# Patient Record
Sex: MALE | Race: WHITE | Employment: UNEMPLOYED | URBAN - METROPOLITAN AREA
[De-identification: names, ages, dates, MRNs, and addresses within clinical notes are randomized per-mention and may not be internally consistent; named-entity substitution may affect disease eponyms.]

---

## 2017-03-03 ENCOUNTER — HOSPITAL ENCOUNTER (EMERGENCY)
Age: 42
Discharge: HOME | End: 2017-03-04
Attending: EMERGENCY MEDICINE | Admitting: EMERGENCY MEDICINE
Payer: SELF-PAY

## 2017-03-03 VITALS
WEIGHT: 250 LBS | SYSTOLIC BLOOD PRESSURE: 126 MMHG | OXYGEN SATURATION: 93 % | BODY MASS INDEX: 31.08 KG/M2 | HEIGHT: 75 IN | TEMPERATURE: 97.8 F | RESPIRATION RATE: 18 BRPM | HEART RATE: 95 BPM | DIASTOLIC BLOOD PRESSURE: 89 MMHG

## 2017-03-03 DIAGNOSIS — F41.8 ANXIETY ASSOCIATED WITH DEPRESSION: Primary | ICD-10-CM

## 2017-03-03 PROCEDURE — 99283 EMERGENCY DEPT VISIT LOW MDM: CPT

## 2017-03-03 RX ORDER — LORAZEPAM 1 MG/1
1 TABLET ORAL
Status: COMPLETED | OUTPATIENT
Start: 2017-03-03 | End: 2017-03-04

## 2017-03-04 PROCEDURE — 74011250637 HC RX REV CODE- 250/637: Performed by: EMERGENCY MEDICINE

## 2017-03-04 RX ORDER — LORAZEPAM 1 MG/1
1 TABLET ORAL
Qty: 5 TAB | Refills: 0 | Status: SHIPPED | OUTPATIENT
Start: 2017-03-04 | End: 2017-07-01

## 2017-03-04 RX ADMIN — LORAZEPAM 1 MG: 1 TABLET ORAL at 00:01

## 2017-03-04 NOTE — ED NOTES
Patient arrived through triage c/o \"feeling out of it\" due to not sleeping x 3 days. Patient states that he normally takes ativan to sleep, and other sleep medications do not work. Patient is resting in bed, bed in low position, call bell in reach.

## 2017-03-04 NOTE — ED NOTES
Dr. Gabbie Arellano reviewed discharge instructions with the patient. The patient verbalized understanding. All questions and concerns were addressed. The patient declined a wheelchair and is discharged ambulatory in the care of family members with instructions and prescriptions in hand. Pt is alert and oriented x 4. Respirations are clear and unlabored.

## 2017-03-04 NOTE — ED TRIAGE NOTES
Pt arrives with c/o of insomnia from the past 3 days. Pt reports he is out of Ativan. Pt states this happens every 3-4 months. Increased anxiety, \" It feels like I am burning with anxiety and I cannot relax\".

## 2017-03-04 NOTE — ED PROVIDER NOTES
HPI Comments: 42 y.o. male with past medical history significant for HTN, PE, Anxiety disorder, Depression, Alcohol abuse, SI who presents from home via taxi with chief complaint of insomnia. Pt reports he has been without sleep x 3 days. Pt states he has had multiple stressors in his life recently related to being required to move. He also c/o anxiety. He notes hx of similar symptoms every \"3-4 months\" at which time Ativan controls symptoms. Pt is followed at the [facility] for primary care and psych. Pt denies SI, HI, hallucinations, fever, chills, urinary symptoms, chest pain, SOB, abdominal pain, nausea, vomiting, dairrhea, constiaption, back pain or neck pain. There are no other acute medical concerns at this time. Social hx: + Occassional EtOH use (\"1x a week\"). Non-smoker. There are no other acute medical concerns at this time. PCP: Anya Guadarrama MD    Note written by Jean Webber, as dictated by Naty Negrete MD 12:09 AM        The history is provided by the patient. No  was used. 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 Family History:   Problem Relation Age of Onset   [facility] Cancer Mother      carcinoma that was metastaic to liver and brain    Heart Disease Father     Depression Father     Substance Abuse Maternal Grandfather     Depression Sister        Social History     Social History    Marital status:      Spouse name: N/A    Number of children: N/A    Years of education: N/A     Occupational History    Not on file.      Social History Main Topics    Smoking status: Former Smoker  Smokeless tobacco: Never Used    Alcohol use 8.0 oz/week     16 Shots of liquor per week      Comment: per pt\"2 quarts of liquor daily\"    Drug use: Yes     Special: Marijuana      Comment: last use 7/25/15    Sexual activity: No     Other Topics Concern    Not on file     Social History Narrative    Pt reports he is , unemployed (on disability.) Continues to have legal problems related to harassment of various family members. Numerous legal problems related to alcohol use. ALLERGIES: Risperidone and Cyclobenzaprine    Review of Systems   Constitutional: Negative for chills and fever. HENT: Negative for congestion. Respiratory: Negative for cough and shortness of breath. Cardiovascular: Negative for chest pain. Gastrointestinal: Negative for abdominal pain, constipation, diarrhea, nausea and vomiting. Genitourinary: Negative for difficulty urinating and dysuria. Musculoskeletal: Negative for back pain and neck pain. Neurological: Negative for headaches. Psychiatric/Behavioral: Positive for sleep disturbance. Negative for hallucinations and suicidal ideas. The patient is nervous/anxious.         - HI   All other systems reviewed and are negative. Vitals:    03/03/17 2346   BP: 126/89   Pulse: 95   Resp: 18   Temp: 97.8 °F (36.6 °C)   SpO2: 93%   Weight: 113.4 kg (250 lb)   Height: 6' 3\" (1.905 m)            Physical Exam   Nursing note and vitals reviewed. CONSTITUTIONAL: Well-appearing; well-nourished; in no apparent distress  HEAD: Normocephalic; atraumatic  EYES: PERRL; EOM intact; conjunctiva and sclera are clear bilaterally. ENT: No rhinorrhea; normal pharynx with no tonsillar hypertrophy; mucous membranes pink/moist, no erythema, no exudate. NECK: Supple; non-tender; no cervical lymphadenopathy  CARD: Normal S1, S2; no murmurs, rubs, or gallops. Regular rate and rhythm.   RESP: Normal respiratory effort; breath sounds clear and equal bilaterally; no wheezes, rhonchi, or rales. ABD: Normal bowel sounds; non-distended; non-tender; no palpable organomegaly, no masses, no bruits. Back Exam: Normal inspection; no vertebral point tenderness, no CVA tenderness. Normal range of motion. EXT: Normal ROM in all four extremities; non-tender to palpation; no swelling or deformity; distal pulses are normal, no edema. SKIN: Warm; dry; no rash. NEURO:Alert and oriented x 3, coherent, YUMIKO-XII grossly intact, sensory and motor are non-focal.  Psych exam: Flat affect, anxious and normal mood; Pt denies SI/HI on direct questioning        MDM  Number of Diagnoses or Management Options  Diagnosis management comments: Assessment: 70-year-old male with anxiety and depression, who appears hemodynamically stable. Patient has a history of alcoholism, but exhibited no symptoms of withdrawal at this time. He appeared hemodynamically stable and coherent. He denies suicidal ideation and homicidal ideation. He doesn't appear to be a risk to himself or others. Plan: Education and reassurance/ Ativan 1 mg p.o./ symptomatic care/ discharge with outpatient [facility]        Amount and/or Complexity of Data Reviewed  Clinical lab tests: ordered and reviewed  Tests in the radiology section of CPT®: ordered and reviewed  Tests in the medicine section of CPT®: reviewed and ordered  Discussion of test results with the performing providers: yes  Decide to obtain previous medical records or to obtain history from someone other than the patient: yes  Obtain history from someone other than the patient: yes  Review and summarize past medical records: yes  Discuss the patient with other providers: yes  Independent visualization of images, tracings, or specimens: yes    Risk of Complications, Morbidity, and/or Mortality  Presenting problems: moderate  Diagnostic procedures: moderate  Management options: moderate      ED Course       Procedures     Progress Note:   Pt has been reexamined by Klever Raman MD. Pt is feeling much better. Symptoms have improved. All available results have been reviewed with pt and any available family. Pt understands sx, dx, and tx in ED. Care plan has been outlined and questions have been answered. Pt is ready to go home. Will send home on Anxiety/Depression instructions. Prescription of Ativan. Outpatient referral with PCP/ [facility] as needed.  Written by Klever Raman MD,12:02 AM

## 2017-03-04 NOTE — DISCHARGE INSTRUCTIONS
We hope that we have addressed all of your medical concerns. The examination and treatment you received in the Emergency Department were for an emergent problem and were not intended as complete care. It is important that you follow up with your healthcare provider(s) for ongoing care. If your symptoms worsen or do not improve as expected, and you are unable to reach your usual health care provider(s), you should return to the Emergency Department. Today's healthcare is undergoing tremendous change, and patient satisfaction surveys are one of the many tools to assess the quality of medical care. You may receive a survey from the [facility] regarding your experience in the Emergency Department. I hope that your experience has been completely positive, particularly the medical care that I provided. As such, please participate in the survey; anything less than excellent does not meet my expectations or intentions. [address] [place] [address] and [employer] participate in nationally recognized quality of care measures. If your blood pressure is greater than 120/80, as reported below, we urge that you seek medical care to address the potential of high blood pressure, commonly known as hypertension. Hypertension can be hereditary or can be caused by certain medical conditions, pain, stress, or \"white coat syndrome. \"       Please make an appointment with your health care provider(s) for follow up of your Emergency Department visit. VITALS:   Patient Vitals for the past 8 hrs:   Temp Pulse Resp BP SpO2   03/03/17 2346 97.8 °F (36.6 °C) 95 18 126/89 93 %          Thank you for allowing us to provide you with medical care today. We realize that you have many choices for your emergency care needs. Please choose us in the future for any continued health care needs. Mahesh Carvajal MD    [facility].   Office: [phone]            No results found for this or any previous visit (from the past 24 hour(s)). No results found. Learning About Anxiety Disorders  What are anxiety disorders? Anxiety disorders are a type of medical problem. They cause severe anxiety. When you feel anxious, you feel that something bad is about to happen. This feeling interferes with your life. These disorders include:  · Generalized anxiety disorder. You feel worried and stressed about many everyday events and activities. This goes on for several months and disrupts your life on most days. · Panic disorder. You have repeated panic attacks. A panic attack is a sudden, intense fear or anxiety. It may make you feel short of breath. Your heart may pound. · Social anxiety disorder. You feel very anxious about what you will say or do in front of people. For example, you may be scared to talk or eat in public. This problem affects your daily life. · Phobias. You are very scared of a specific object, situation, or activity. For example, you may fear spiders, high places, or small spaces. What are the symptoms? Generalized anxiety disorder  Symptoms may include:  · Feeling worried and stressed about many things almost every day. · Feeling tired or irritable. You may have a hard time concentrating. · Having headaches or muscle aches. · Having a hard time swallowing. · Feeling shaky, sweating, or having hot flashes. Panic disorder  You may have repeated panic attacks when there is no reason for feeling afraid. You may change your daily activities because you worry that you will have another attack. Symptoms may include:  · Intense fear, terror, or anxiety. · Trouble breathing or very fast breathing. · Chest pain or tightness. · A heartbeat that races or is not regular. Social anxiety disorder  Symptoms may include:  · Fear about a social situation, such as eating in front of others or speaking in public. You may worry a lot. Or you may be afraid that something bad will happen. · Anxiety that can cause you to blush, sweat, and feel shaky. · A heartbeat that is faster than normal.  · A hard time focusing. Phobias  Symptoms may include:  · More fear than most people of being around an object, being in a situation, or doing an activity. You might also be stressed about the chance of being around the thing you fear. · Worry about losing control, panicking, fainting, or having physical symptoms like a faster heartbeat when you are around the situation or object. How are these disorders treated? Anxiety disorders can be treated with medicines or counseling. A combination of both may be used. Medicines may include:  · Antidepressants. These may help your symptoms by keeping chemicals in your brain in balance. · Benzodiazepines. These may give you short-term relief of your symptoms. Some people use cognitive-behavioral therapy. A therapist helps you learn to change stressful or bad thoughts into helpful thoughts. Lead a healthy lifestyle  A healthy lifestyle may help you feel better. · Get at least 30 minutes of exercise on most days of the week. Walking is a good choice. · Eat a healthy diet. Include fruits, vegetables, lean proteins, and whole grains in your diet each day. · Try to go to bed at the same time every night. Try for 8 hours of sleep a night. · Find ways to manage stress. Try relaxation exercises. · Avoid alcohol and illegal drugs. Follow-up care is a key part of your treatment and safety. Be sure to make and go to all appointments, and call your doctor if you are having problems. It's also a good idea to know your test results and keep a list of the medicines you take. Where can you learn more? Go to http://sapna-jaron.info/. Enter K645 in the search box to learn more about \"Learning About Anxiety Disorders. \"  Current as of: July 26, 2016  Content Version: 11.1  © 3993-7150 [employer], [employer]. Care instructions adapted under license by [employer] (which disclaims liability or warranty for this information). If you have questions about a medical condition or this instruction, always ask your healthcare professional. [address] any warranty or liability for your use of this information.

## 2017-03-04 NOTE — ED NOTES
Patient attributes trouble sleeping due to anxiety, increased stress due to having to move suddenly.

## 2017-05-06 ENCOUNTER — ED HISTORICAL/CONVERTED ENCOUNTER (OUTPATIENT)
Dept: OTHER | Age: 42
End: 2017-05-06

## 2017-06-18 ENCOUNTER — APPOINTMENT (OUTPATIENT)
Dept: CT IMAGING | Age: 42
DRG: 897 | End: 2017-06-18
Attending: PHYSICIAN ASSISTANT
Payer: MEDICARE

## 2017-06-18 ENCOUNTER — HOSPITAL ENCOUNTER (INPATIENT)
Age: 42
LOS: 4 days | Discharge: HOME | DRG: 897 | End: 2017-06-22
Attending: EMERGENCY MEDICINE | Admitting: HOSPITALIST
Payer: MEDICARE

## 2017-06-18 DIAGNOSIS — S37.011A RENAL HEMATOMA, RIGHT, INITIAL ENCOUNTER: ICD-10-CM

## 2017-06-18 DIAGNOSIS — I26.99 PE (PULMONARY THROMBOEMBOLISM) (HCC): Primary | ICD-10-CM

## 2017-06-18 DIAGNOSIS — F10.10 ALCOHOL ABUSE: ICD-10-CM

## 2017-06-18 PROBLEM — E87.1 HYPONATREMIA: Status: ACTIVE | Noted: 2017-06-18

## 2017-06-18 PROBLEM — S37.019A RENAL HEMATOMA: Status: ACTIVE | Noted: 2017-06-18

## 2017-06-18 LAB
ALBUMIN SERPL BCP-MCNC: 3.4 G/DL (ref 3.5–5)
ALBUMIN/GLOB SERPL: 0.9 {RATIO} (ref 1.1–2.2)
ALP SERPL-CCNC: 135 U/L (ref 45–117)
ALT SERPL-CCNC: 31 U/L (ref 12–78)
AMPHET UR QL SCN: NEGATIVE
ANION GAP BLD CALC-SCNC: 15 MMOL/L (ref 5–15)
APAP SERPL-MCNC: <2 UG/ML (ref 10–30)
APPEARANCE UR: CLEAR
AST SERPL W P-5'-P-CCNC: 40 U/L (ref 15–37)
BACTERIA URNS QL MICRO: NEGATIVE /HPF
BARBITURATES UR QL SCN: POSITIVE
BASOPHILS # BLD AUTO: 0 K/UL (ref 0–0.1)
BASOPHILS # BLD: 0 % (ref 0–1)
BENZODIAZ UR QL: NEGATIVE
BILIRUB SERPL-MCNC: 0.7 MG/DL (ref 0.2–1)
BILIRUB UR QL: NEGATIVE
BUN SERPL-MCNC: 7 MG/DL (ref 6–20)
BUN/CREAT SERPL: 9 (ref 12–20)
CALCIUM SERPL-MCNC: 8.4 MG/DL (ref 8.5–10.1)
CANNABINOIDS UR QL SCN: NEGATIVE
CHLORIDE SERPL-SCNC: 90 MMOL/L (ref 97–108)
CO2 SERPL-SCNC: 20 MMOL/L (ref 21–32)
COCAINE UR QL SCN: NEGATIVE
COLOR UR: ABNORMAL
CREAT SERPL-MCNC: 0.79 MG/DL (ref 0.7–1.3)
DRUG SCRN COMMENT,DRGCM: ABNORMAL
EOSINOPHIL # BLD: 0 K/UL (ref 0–0.4)
EOSINOPHIL NFR BLD: 0 % (ref 0–7)
EPITH CASTS URNS QL MICRO: ABNORMAL /LPF
ERYTHROCYTE [DISTWIDTH] IN BLOOD BY AUTOMATED COUNT: 13.3 % (ref 11.5–14.5)
ETHANOL SERPL-MCNC: 153 MG/DL
GLOBULIN SER CALC-MCNC: 3.6 G/DL (ref 2–4)
GLUCOSE SERPL-MCNC: 89 MG/DL (ref 65–100)
GLUCOSE UR STRIP.AUTO-MCNC: NEGATIVE MG/DL
HCT VFR BLD AUTO: 38.2 % (ref 36.6–50.3)
HGB BLD-MCNC: 13.7 G/DL (ref 12.1–17)
HGB UR QL STRIP: NEGATIVE
KETONES UR QL STRIP.AUTO: 15 MG/DL
LEUKOCYTE ESTERASE UR QL STRIP.AUTO: NEGATIVE
LIPASE SERPL-CCNC: 114 U/L (ref 73–393)
LYMPHOCYTES # BLD AUTO: 17 % (ref 12–49)
LYMPHOCYTES # BLD: 2 K/UL (ref 0.8–3.5)
MAGNESIUM SERPL-MCNC: 2 MG/DL (ref 1.6–2.4)
MCH RBC QN AUTO: 30.2 PG (ref 26–34)
MCHC RBC AUTO-ENTMCNC: 35.9 G/DL (ref 30–36.5)
MCV RBC AUTO: 84.3 FL (ref 80–99)
METHADONE UR QL: NEGATIVE
MONOCYTES # BLD: 1.1 K/UL (ref 0–1)
MONOCYTES NFR BLD AUTO: 9 % (ref 5–13)
NEUTS SEG # BLD: 8.5 K/UL (ref 1.8–8)
NEUTS SEG NFR BLD AUTO: 74 % (ref 32–75)
NITRITE UR QL STRIP.AUTO: NEGATIVE
OPIATES UR QL: NEGATIVE
OSMOLALITY SERPL: 308 MOSM/KG H2O
OSMOLALITY UR: 102 MOSM/KG H2O
PCP UR QL: NEGATIVE
PH UR STRIP: 6 [PH] (ref 5–8)
PHOSPHATE SERPL-MCNC: 3 MG/DL (ref 2.6–4.7)
PLATELET # BLD AUTO: 211 K/UL (ref 150–400)
POTASSIUM SERPL-SCNC: 4 MMOL/L (ref 3.5–5.1)
PROT SERPL-MCNC: 7 G/DL (ref 6.4–8.2)
PROT UR STRIP-MCNC: NEGATIVE MG/DL
RBC # BLD AUTO: 4.53 M/UL (ref 4.1–5.7)
RBC #/AREA URNS HPF: ABNORMAL /HPF (ref 0–5)
SALICYLATES SERPL-MCNC: <1.7 MG/DL (ref 2.8–20)
SODIUM SERPL-SCNC: 125 MMOL/L (ref 136–145)
SODIUM SERPL-SCNC: 132 MMOL/L (ref 136–145)
SODIUM UR-SCNC: 24 MMOL/L
SP GR UR REFRACTOMETRY: 1.01 (ref 1–1.03)
UROBILINOGEN UR QL STRIP.AUTO: 0.2 EU/DL (ref 0.2–1)
WBC # BLD AUTO: 11.7 K/UL (ref 4.1–11.1)
WBC URNS QL MICRO: ABNORMAL /HPF (ref 0–4)

## 2017-06-18 PROCEDURE — 83735 ASSAY OF MAGNESIUM: CPT | Performed by: HOSPITALIST

## 2017-06-18 PROCEDURE — 80307 DRUG TEST PRSMV CHEM ANLYZR: CPT | Performed by: EMERGENCY MEDICINE

## 2017-06-18 PROCEDURE — 83930 ASSAY OF BLOOD OSMOLALITY: CPT | Performed by: HOSPITALIST

## 2017-06-18 PROCEDURE — 84295 ASSAY OF SERUM SODIUM: CPT | Performed by: HOSPITALIST

## 2017-06-18 PROCEDURE — 74011250637 HC RX REV CODE- 250/637: Performed by: HOSPITALIST

## 2017-06-18 PROCEDURE — 65660000000 HC RM CCU STEPDOWN

## 2017-06-18 PROCEDURE — 74011250636 HC RX REV CODE- 250/636: Performed by: HOSPITALIST

## 2017-06-18 PROCEDURE — 85025 COMPLETE CBC W/AUTO DIFF WBC: CPT | Performed by: EMERGENCY MEDICINE

## 2017-06-18 PROCEDURE — 90791 PSYCH DIAGNOSTIC EVALUATION: CPT

## 2017-06-18 PROCEDURE — 83935 ASSAY OF URINE OSMOLALITY: CPT | Performed by: HOSPITALIST

## 2017-06-18 PROCEDURE — 83690 ASSAY OF LIPASE: CPT | Performed by: PHYSICIAN ASSISTANT

## 2017-06-18 PROCEDURE — 84300 ASSAY OF URINE SODIUM: CPT | Performed by: HOSPITALIST

## 2017-06-18 PROCEDURE — 81001 URINALYSIS AUTO W/SCOPE: CPT | Performed by: PHYSICIAN ASSISTANT

## 2017-06-18 PROCEDURE — 74011250636 HC RX REV CODE- 250/636: Performed by: PHYSICIAN ASSISTANT

## 2017-06-18 PROCEDURE — 74011636320 HC RX REV CODE- 636/320: Performed by: EMERGENCY MEDICINE

## 2017-06-18 PROCEDURE — 96361 HYDRATE IV INFUSION ADD-ON: CPT

## 2017-06-18 PROCEDURE — 99285 EMERGENCY DEPT VISIT HI MDM: CPT

## 2017-06-18 PROCEDURE — 36415 COLL VENOUS BLD VENIPUNCTURE: CPT | Performed by: EMERGENCY MEDICINE

## 2017-06-18 PROCEDURE — 74177 CT ABD & PELVIS W/CONTRAST: CPT

## 2017-06-18 PROCEDURE — 96374 THER/PROPH/DIAG INJ IV PUSH: CPT

## 2017-06-18 PROCEDURE — 74011000250 HC RX REV CODE- 250: Performed by: HOSPITALIST

## 2017-06-18 PROCEDURE — 80053 COMPREHEN METABOLIC PANEL: CPT | Performed by: EMERGENCY MEDICINE

## 2017-06-18 PROCEDURE — 84100 ASSAY OF PHOSPHORUS: CPT | Performed by: HOSPITALIST

## 2017-06-18 PROCEDURE — 74011000258 HC RX REV CODE- 258: Performed by: EMERGENCY MEDICINE

## 2017-06-18 PROCEDURE — 96375 TX/PRO/DX INJ NEW DRUG ADDON: CPT

## 2017-06-18 RX ORDER — MORPHINE SULFATE 2 MG/ML
2 INJECTION, SOLUTION INTRAMUSCULAR; INTRAVENOUS
Status: DISCONTINUED | OUTPATIENT
Start: 2017-06-18 | End: 2017-06-20

## 2017-06-18 RX ORDER — LORAZEPAM 2 MG/ML
0.5 INJECTION INTRAMUSCULAR
Status: COMPLETED | OUTPATIENT
Start: 2017-06-18 | End: 2017-06-18

## 2017-06-18 RX ORDER — OXYCODONE HYDROCHLORIDE 5 MG/1
5 TABLET ORAL
Status: DISCONTINUED | OUTPATIENT
Start: 2017-06-18 | End: 2017-06-18

## 2017-06-18 RX ORDER — METOPROLOL TARTRATE 50 MG/1
50 TABLET ORAL 2 TIMES DAILY
Status: DISCONTINUED | OUTPATIENT
Start: 2017-06-18 | End: 2017-06-22 | Stop reason: HOSPADM

## 2017-06-18 RX ORDER — DIAZEPAM 10 MG/2ML
20 INJECTION INTRAMUSCULAR
Status: DISPENSED | OUTPATIENT
Start: 2017-06-18 | End: 2017-06-18

## 2017-06-18 RX ORDER — SODIUM CHLORIDE 9 MG/ML
75 INJECTION, SOLUTION INTRAVENOUS CONTINUOUS
Status: DISCONTINUED | OUTPATIENT
Start: 2017-06-18 | End: 2017-06-19

## 2017-06-18 RX ORDER — SODIUM CHLORIDE 0.9 % (FLUSH) 0.9 %
10 SYRINGE (ML) INJECTION
Status: COMPLETED | OUTPATIENT
Start: 2017-06-18 | End: 2017-06-18

## 2017-06-18 RX ORDER — ONDANSETRON 4 MG/1
4 TABLET, ORALLY DISINTEGRATING ORAL
Status: DISCONTINUED | OUTPATIENT
Start: 2017-06-18 | End: 2017-06-22 | Stop reason: HOSPADM

## 2017-06-18 RX ORDER — DIAZEPAM 10 MG/2ML
20 INJECTION INTRAMUSCULAR
Status: DISCONTINUED | OUTPATIENT
Start: 2017-06-18 | End: 2017-06-22 | Stop reason: HOSPADM

## 2017-06-18 RX ORDER — MORPHINE SULFATE 4 MG/ML
2 INJECTION, SOLUTION INTRAMUSCULAR; INTRAVENOUS
Status: COMPLETED | OUTPATIENT
Start: 2017-06-18 | End: 2017-06-18

## 2017-06-18 RX ORDER — DIAZEPAM 10 MG/2ML
10 INJECTION INTRAMUSCULAR
Status: DISCONTINUED | OUTPATIENT
Start: 2017-06-18 | End: 2017-06-22 | Stop reason: HOSPADM

## 2017-06-18 RX ORDER — HYDRALAZINE HYDROCHLORIDE 20 MG/ML
20 INJECTION INTRAMUSCULAR; INTRAVENOUS
Status: DISCONTINUED | OUTPATIENT
Start: 2017-06-18 | End: 2017-06-21

## 2017-06-18 RX ORDER — PROCHLORPERAZINE EDISYLATE 5 MG/ML
10 INJECTION INTRAMUSCULAR; INTRAVENOUS
Status: DISCONTINUED | OUTPATIENT
Start: 2017-06-18 | End: 2017-06-22 | Stop reason: HOSPADM

## 2017-06-18 RX ORDER — ACETAMINOPHEN 325 MG/1
650 TABLET ORAL
Status: DISCONTINUED | OUTPATIENT
Start: 2017-06-18 | End: 2017-06-22 | Stop reason: HOSPADM

## 2017-06-18 RX ADMIN — HYDRALAZINE HYDROCHLORIDE 20 MG: 20 INJECTION INTRAMUSCULAR; INTRAVENOUS at 20:07

## 2017-06-18 RX ADMIN — SODIUM CHLORIDE 100 ML: 900 INJECTION, SOLUTION INTRAVENOUS at 15:16

## 2017-06-18 RX ADMIN — METOPROLOL TARTRATE 50 MG: 50 TABLET ORAL at 19:26

## 2017-06-18 RX ADMIN — FOLIC ACID: 5 INJECTION, SOLUTION INTRAMUSCULAR; INTRAVENOUS; SUBCUTANEOUS at 19:11

## 2017-06-18 RX ADMIN — SODIUM CHLORIDE 1000 ML: 900 INJECTION, SOLUTION INTRAVENOUS at 15:19

## 2017-06-18 RX ADMIN — DIAZEPAM 20 MG: 5 INJECTION, SOLUTION INTRAMUSCULAR; INTRAVENOUS at 20:54

## 2017-06-18 RX ADMIN — IOPAMIDOL 100 ML: 755 INJECTION, SOLUTION INTRAVENOUS at 15:16

## 2017-06-18 RX ADMIN — LORAZEPAM 0.5 MG: 2 INJECTION INTRAMUSCULAR; INTRAVENOUS at 15:32

## 2017-06-18 RX ADMIN — OXYCODONE HYDROCHLORIDE 5 MG: 5 TABLET ORAL at 18:02

## 2017-06-18 RX ADMIN — DIAZEPAM 20 MG: 5 INJECTION, SOLUTION INTRAMUSCULAR; INTRAVENOUS at 19:27

## 2017-06-18 RX ADMIN — ONDANSETRON 4 MG: 4 TABLET, ORALLY DISINTEGRATING ORAL at 22:19

## 2017-06-18 RX ADMIN — OXYCODONE HYDROCHLORIDE 5 MG: 5 TABLET ORAL at 22:39

## 2017-06-18 RX ADMIN — Medication 2 MG: at 16:21

## 2017-06-18 RX ADMIN — DIAZEPAM 10 MG: 5 INJECTION, SOLUTION INTRAMUSCULAR; INTRAVENOUS at 18:32

## 2017-06-18 RX ADMIN — Medication 10 ML: at 15:16

## 2017-06-18 RX ADMIN — SODIUM CHLORIDE 1000 ML: 900 INJECTION, SOLUTION INTRAVENOUS at 13:29

## 2017-06-18 NOTE — ED NOTES
Nurse at bedside to draw labs. Patient reports \"It feel like somethings taking over my head. \" Patient also reports increased anxiety. Nurse re-assessed CIWA scale and medicated according to CIWA protocol.

## 2017-06-18 NOTE — ED NOTES
Bedside and Verbal shift change report given to Cydney Wilder RN (oncoming nurse) by Liv Mccray RN (offgoing nurse). Report included the following information ED Summary, MAR and Recent Results.

## 2017-06-18 NOTE — ED NOTES
Cheryle Vickers closed in psych treatment room for pt's safety. Pt reports actively having SI and hallucinations. Pt given pillow for comfort and pt refused warm blanket at this time. Pt has sheet for privacy. Clothing and personal belongings placed in nurse's station for pt's safety.

## 2017-06-18 NOTE — ED PROVIDER NOTES
HPI Comments: 42 y.o. male with past medical history significant for anxiety, depression, sciatica, DDD, alcohol abuse, SI, HTN, and PE who presents via EMS with chief complaint of SI. The pt says that he called EMS due to his ETOH abuse and SI. The pt reports that his SI began yesterday. The pt indicates that he was released from long term 10 days ago after being incarcerated for parole violations and he has been regularly drinking since then. The pt reports that he has been drinking approximately 30 beers a day for the past 2 years. The pt says that he drank about 30 beers PTA today. The pt says that he \"is an alcoholic and (he) cannot beat. \" The pt indicates that he previously developed tremors with ETOH withdrawal. He denies any known liver issues. The pt also c/o diffuse, bilateral abdominal pain that started today with associated nausea and vomiting. The pt reports that he vomited about 10 times today. Denies diarrhea, constipation, blood in stool, and previous abdominal surgeries. Denies CP or SOB. There are no other acute medical concerns at this time. Old Chart Review: He was last seen in the ED 3.5 months ago for insomnia. Social hx: Current ETOH use  PCP: Natalie Stearns MD    Note written by Jean Blackburn, as dictated by Peter Campbell PA-C 2:20 PM      The history is provided by the patient. No  was used. 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       Family History:   Problem Relation Age of Onset    Cancer Mother      carcinoma that was metastaic to liver and brain    Heart Disease Father    [facility] Depression Father     Substance Abuse Maternal Grandfather     Depression Sister        Social History     Social History    Marital status:      Spouse name: N/A    Number of children: N/A    Years of education: N/A     Occupational History    Not on file. Social History Main Topics    Smoking status: Former Smoker    Smokeless tobacco: Never Used    Alcohol use 8.0 oz/week     16 Shots of liquor per week      Comment: per pt\"2 quarts of liquor daily\"    Drug use: Yes     Special: Marijuana      Comment: last use 7/25/15    Sexual activity: No     Other Topics Concern    Not on file     Social History Narrative    Pt reports he is , unemployed (on disability.) Continues to have legal problems related to harassment of various family members. Numerous legal problems related to alcohol use. ALLERGIES: Risperidone and Cyclobenzaprine    Review of Systems   Constitutional: Negative for fever. HENT: Negative for congestion. Respiratory: Negative for shortness of breath. Cardiovascular: Negative for chest pain. Gastrointestinal: Positive for abdominal pain (diffuse, bilateral), nausea and vomiting (10x today). Negative for blood in stool, constipation and diarrhea. Genitourinary: Negative for dysuria. Skin: Negative for rash. Neurological: Negative for seizures. Psychiatric/Behavioral: Positive for suicidal ideas. All other systems reviewed and are negative. There were no vitals filed for this visit. Physical Exam   Constitutional: He is oriented to person, place, and time. He appears well-developed and well-nourished. No distress. Somewhat unkempt, smells of alcohol   HENT:   Head: Normocephalic. Right Ear: External ear normal.   Left Ear: External ear normal.   Dry MM   Eyes: Conjunctivae are normal. No scleral icterus. Neck: Neck supple. No tracheal deviation present. Cardiovascular: Regular rhythm and normal heart sounds.   Exam reveals no gallop and no friction rub. No murmur heard. tachycardic   Pulmonary/Chest: Effort normal and breath sounds normal. No stridor. No respiratory distress. He has no wheezes. Abdominal: Soft. He exhibits no distension. There is tenderness. There is no rebound and no guarding. Diffuse abdominal tenderness, non-focal   Musculoskeletal: Normal range of motion. He exhibits no edema or tenderness. Large area of ecchymosis over the right lumbar muscles   Neurological: He is alert and oriented to person, place, and time. Skin: Skin is warm and dry. Psychiatric: He has a normal mood and affect. His behavior is normal.   Nursing note and vitals reviewed. MDM  Number of Diagnoses or Management Options  Alcohol abuse:   PE (pulmonary thromboembolism) ([address]):   Renal hematoma, right, initial encounter:   Diagnosis management comments: 42year old male hx alcohol abuse presenting for same. Pt presents with SI, abdominal pain. Labs remarkable for hyponatremia. CT abd pelvis showing right renal hematoma and likely bilateral LL PE. Given alcohol abuse, fall risk, renal hematoma pt poor anticoagulant candidate, admitted to the hospitalist service for further evaluation and treatment. Amount and/or Complexity of Data Reviewed  Clinical lab tests: ordered and reviewed  Tests in the radiology section of CPT®: ordered and reviewed  Discuss the patient with other providers: yes (Dr. Arun Caicedo, ED attending. Dr. Mendel Carrie, hospitalist.)    Critical Care  Total time providing critical care: 30-74 minutes (Total critical care time spend exclusive of procedures:  40 minutes - renal hematoma, alcohol intoxication and abuse, pulmonary emboli, persistent tachycardia)    ED Course       Procedures               Called by radiologist.  Discussed results with patient. Noted that he was last on anticoagulants in 2014. Denies recent hemoptysis, CP, SOB.   COLTON Negrete  4:19 PM        CONSULT NOTE:  4:10 PM Jia Puente Feli Bernstein PA-C spoke with Dr. Nanette Robbins, Consult for Hospitalist.  Discussed available diagnostic tests and clinical findings. He is in agreement with care plans as outlined. Dr. Nanette Robbins will see and admit pt for further evaluation of treatment.

## 2017-06-18 NOTE — PROGRESS NOTES
18:15: TRANSFER - IN REPORT:    Verbal report received from Casimiro Montelongo, [facility] (name) on Sai Burrell  being received from ED(unit) for routine progression of care      Report consisted of patients Situation, Background, Assessment and   Recommendations(SBAR). Information from the following report(s) ED Summary, MAR, Accordion and Recent Results was reviewed with the receiving nurse. Opportunity for questions and clarification was provided. Assessment completed upon patients arrival to unit and care assumed. 1848:; Patient arrived to floor, placed on tele monitoring confirmed with tech. Vitals taken. HR and BP elevated, will monitor closely. Placed on seizure precautions. Sitter at bedside. Dual skin assessment completed, multiple bruising and abrasions noted, Patient states that he has fallen more than 30 times in the past year and \"doesn't remember the past few days so he is not sure if he has fallen or not. \"   1900: RN supervisor at bedside provided seizure padding. Made aware of vitals signs. Will continue to monitor closely. 1912: CIWA scale done, score is 19, not  having hallucinations. Will notify MD and continue to monitor closely. 1920: Spoke to MD Esequiel Guzman to clarify the order of 20 mg of Valium x3, MD states it is a loading dose. MD notified of patient's vital signs and CIWA score. 1926: Metoprolol and Valium given. Will continue to monitor closely. 1940: Spoke to Urology MD who was consulted, will see patient tomorrow. 1945: MD on call paged concerning patients MEWS score. No call back. Continuing to monitor closely. 2000: MD on call paged concerning patient's BP. No call back Continuing to monitor closely. 2007: Hydralazine given for Patient's BP.   2021: BP rechecked 140/94,   2132: BP rechecked 143/89, HR 90     1930: Bedside shift change report given to Trang Bianchi RN  (oncoming nurse) by Oli Morales RN  (offgoing nurse).  Report included the following information SBAR, DURAN [employer] Accordion and Recent Results.

## 2017-06-18 NOTE — ED TRIAGE NOTES
Triage: pt arrives via EMS. Per EMS, pt called 911 for SI with a plan to overdose on medications. Pt has not made any attempt on his life at this time. Pt also c/o abd pain x2 days. Pt drinks 25-30 beers/day. Last drink <1 hour ago \"over a case of beer. \" VSS. NSR en route. Pt was at [facility] yesterday for SI but signed an AMA form because \"I wanted to go drink. \"

## 2017-06-18 NOTE — IP AVS SNAPSHOT
[address] [place] [address] 
[phone] Patient: Dorothy Holcomb MRN: [id number] :[id number] You are allergic to the following Allergen Reactions Risperidone Anaphylaxis Cyclobenzaprine Unknown (comments) Recent Documentation Height Weight BMI Smoking Status 1.905 m 109.7 kg 30.23 kg/m2 Former Smoker Emergency Contacts Name Discharge Info Relation Home Work Mobile Antonio Horowitz DISCHARGE CAREGIVER [3] Other Relative [6] [phone] About your hospitalization You were admitted on:  2017 You last received care in the:  [facility] CV SERVICES UNIT You were discharged on:  2017 Unit phone number:  [phone] Why you were hospitalized Your primary diagnosis was:  Hyponatremia Your diagnoses also included:  Psychiatric Disorder, Anxiety Disorder, Pulmonary Embolism (Hcc), Alcohol Use Disorder, Severe, Dependence (Hcc), Depression, Major, Renal Hematoma, Suicidal Ideation Providers Seen During Your Hospitalizations Provider Role Specialty Primary office phone Maura Valente MD Attending Provider Emergency Medicine [phone] Maritza Delgado MD Attending Provider Hospitalist [phone] Ulises Hart MD Attending Provider Internal Medicine [phone] Your Primary Care Physician (PCP) Primary Care Physician Office Phone Office Fax [place] 1985, [address] [phone] Follow-up Information Follow up With Details Comments Contact Info Nellie Vee MD In 1 week Discharge follow up  [facility] [address] [id number] [phone] Arlene Donovan MD In 1 month 1-2 Month F/u with Urologist for Repeat CT of abdomen [facility] [address] [place] [address] 
[phone] Current Discharge Medication List  
  
 START taking these medications Dose & Instructions Dispensing Information Comments Morning Noon Evening Bedtime  
 acetaminophen 325 mg tablet Commonly known as:  TYLENOL Your last dose was: Your next dose is:    
   
   
 Dose:  650 mg Take 2 Tabs by mouth every six (6) hours as needed. Quantity:  30 Tab Refills:  0  
     
   
   
   
  
 lisinopril 10 mg tablet Commonly known as:  Reid Lopez Your last dose was: Your next dose is:    
   
   
 Dose:  10 mg Take 1 Tab by mouth daily. Quantity:  30 Tab Refills:  1 CONTINUE these medications which have CHANGED Dose & Instructions Dispensing Information Comments Morning Noon Evening Bedtime LORazepam 1 mg tablet Commonly known as:  ATIVAN What changed:  Another medication with the same name was removed. Continue taking this medication, and follow the directions you see here. Your last dose was: Your next dose is:    
   
   
 Dose:  1 mg Take 1 Tab by mouth nightly as needed for Anxiety. Max Daily Amount: 1 mg. Quantity:  5 Tab Refills:  0  
     
   
   
   
  
 venlafaxine 75 mg tablet Commonly known as:  [facility] What changed:   
- medication strength 
- how to take this 
- additional instructions Your last dose was: Your next dose is: Take 2 tab in AM 1 tab PM  
 Quantity:  21 Tab Refills:  0 CONTINUE these medications which have NOT CHANGED Dose & Instructions Dispensing Information Comments Morning Noon Evening Bedtime  
 metoprolol tartrate 50 mg tablet Commonly known as:  LOPRESSOR Your last dose was: Your next dose is:    
   
   
 Dose:  50 mg Take 1 Tab by mouth two (2) times a day. Quantity:  60 Tab Refills:  1  
     
   
   
   
  
 ondansetron 4 mg disintegrating tablet Commonly known as:  ZOFRAN ODT Your last dose was: Your next dose is:    
   
   
 Dose:  4 mg Take 1 Tab by mouth every eight (8) hours as needed for Nausea. Quantity:  15 Tab Refills:  0  
     
   
   
   
  
 zolpidem 10 mg tablet Commonly known as:  AMBIEN Your last dose was: Your next dose is:    
   
   
 Dose:  10 mg Take 1 Tab by mouth nightly as needed for Sleep. Max Daily Amount: 10 mg.  
 Quantity:  10 Tab Refills:  0 Where to Get Your Medications Information on where to get these meds will be given to you by the nurse or doctor. ! Ask your nurse or doctor about these medications  
  acetaminophen 325 mg tablet  
 lisinopril 10 mg tablet  
 metoprolol tartrate 50 mg tablet  
 venlafaxine 75 mg tablet Discharge Instructions Please bring this form with you to show your primary care provider at your follow-up appointment. Primary care provider:  Dr. Erika Hawk MD 
 
Discharging provider:  Drew Romberg, MD 
 
You have been admitted to the hospital with the following diagnoses: · Hyponatremia · Alcohol Withdrawal 
· Right Renal Hematoma · Depression FOLLOW-UP CARE RECOMMENDATIONS: 
 
APPOINTMENTS: 
Follow-up Information Follow up With Details Comments Contact Info Erika Hawk MD In 1 week Discharge follow up  [place] [phone] 
[phone] Melvina Mejia MD  1-2 Month F/u with Urologist for Repeat CT of abdomen [place] [address] 
[phone] FOLLOW-UP TESTS recommended:NONE PENDING TEST RESULTS: 
At the time of your discharge the following test results are still pending: NONE Please make sure you review these results with your outpatient follow-up provider(s). SYMPTOMS to watch for: chest pain, shortness of breath, fever, chills, nausea, vomiting, diarrhea, change in mentation, falling, weakness, bleeding. DIET/what to eat:  Regular Diet ACTIVITY:  Activity as tolerated WOUND CARE: NONE 
 
EQUIPMENT needed:  NONE What to do if new or unexpected symptoms occur? If you experience any of the above symptoms (or should other concerns or questions arise after discharge) please call your primary care physician. Return to the emergency room if you cannot get hold of your doctor. · It is very important that you keep your follow-up appointment(s). · Please bring discharge papers, medication list (and/or medication bottles) to your follow-up appointments for review by your outpatient provider(s). · Please check the list of medications and be sure it includes every medication (even non-prescription medications) that your provider wants you to take. · It is important that you take the medication exactly as they are prescribed. · Keep your medication in the bottles provided by the pharmacist and keep a list of the medication names, dosages, and times to be taken in your wallet. · Do not take other medications without consulting your doctor. · If you have any questions about your medications or other instructions, please talk to your nurse or care provider before you leave the hospital. 
 
I understand that if any problems occur once I am at home I am to contact my physician. These instructions were explained to me and I had the opportunity to ask questions. Discharge Orders None [employer] Announcement We are excited to announce that we are making your provider's discharge notes available to you in [employer]. You will see these notes when they are completed and signed by the physician that discharged you from your recent hospital stay. If you have any questions or concerns about any information you see in [employer], please call the Health Information Department where you were seen or reach out to your Primary Care Provider for more information about your plan of care. Introducing [facility] SERVICES! Susy Solis introduces [employer] patient portal. Now you can access parts of your medical record, email your doctor's office, and request medication refills online. 1. In your internet browser, go to [email or web address] 2. Click on the First Time User? Click Here link in the Sign In box. You will see the New Member Sign Up page. 3. Enter your K[employer] Access Code exactly as it appears below. You will not need to use this code after youve completed the sign-up process. If you do not sign up before the expiration date, you must request a new code. · [employer] Access Code: Claudia Peabody Expires: 9/17/2017 10:43 AM 
 
4. Enter the last four digits of your Social Security Number (xxxx) and Date of Birth (mm/dd/yyyy) as indicated and click Submit. You will be taken to the next sign-up page. 5. Create a [employer] ID. This will be your Kuddle login ID and cannot be changed, so think of one that is secure and easy to remember. 6. Create a [employer] password. You can change your password at any time. 7. Enter your Password Reset Question and Answer. This can be used at a later time if you forget your password. 8. Enter your e-mail address. You will receive e-mail notification when new information is available in [address] 9. Click Sign Up. You can now view and download portions of your medical record. 10. Click the Download Summary menu link to download a portable copy of your medical information. If you have questions, please visit the Frequently Asked Questions section of the [employer] website. Remember, [employer] is NOT to be used for urgent needs. For medical emergencies, dial 911. Now available from your iPhone and Android! General Information Please provide this summary of care documentation to your next provider. Patient Signature:  ____________________________________________________________ Date:  ____________________________________________________________  
  
Patria Donnell Provider Signature:  ____________________________________________________________ Date:  ____________________________________________________________

## 2017-06-18 NOTE — ROUTINE PROCESS
TRANSFER - OUT REPORT:    Verbal report given to [address] (name) on Alexandria Gibson  being transferred to [address] [place] [address] (unit) for routine progression of care       Report consisted of patients Situation, Background, Assessment and   Recommendations(SBAR). Information from the following report(s) SBAR, ED Summary, [facility] and Recent Results was reviewed with the receiving nurse. Lines:   Peripheral IV 06/18/17 Left Antecubital (Active)   Site Assessment Clean, dry, & intact 6/18/2017  1:21 PM   Phlebitis Assessment 0 6/18/2017  1:21 PM   Infiltration Assessment 0 6/18/2017  1:21 PM   Dressing Status Clean, dry, & intact 6/18/2017  1:21 PM   Dressing Type Transparent;Tape 6/18/2017  1:21 PM   Hub Color/Line Status Green;Flushed 6/18/2017  1:21 PM        Opportunity for questions and clarification was provided.       Patient transported with:   Monitor

## 2017-06-18 NOTE — BSMART NOTE
Patient is a 36yo male who arrives to ER after calling 911 reporting that he is suicidal and needs help with his alcohol problem. Patient reports that he drinks ~30 beers a day and that he has been drinking since his teens. He reports last substance abuse treatment was in 2013 but that he has been admitted multiple times recently for mental health reasons with most recent hospitalization 2-3 days ago for suicidal ideations and alcohol at [facility] but he reports leaving AMA yesterday because \"I wanted to go drink. \" He reports that he takes Effexor, Invega both an injection and pills and metoprolol. Patient reports that has demonic spirits in his head that \"bounce around\" and a spirit that is sexual that lives by his prostate. He reports that he needs help because the only relief he gets is when he drinks. At this point the PA interrupted due to radiologist calling and her needing to talk to the patient. Patient was found the have a hematoma on the kidney and blood clots in his lungs. PA reports that patient will be medically admitted due to the findings and that she will request a psychiatric consult. Explained process for medical admission and psychiatric consult to the patient and he reports understanding.

## 2017-06-18 NOTE — H&P
HISTORY AND PHYSICAL      PCP: Jose Barcenas MD  History source: the patient      CC: suicidal thoughts      HPI: this is a 42 y.o man with alcohol abuse, HTN, history of PE in 2014, depression and anxiety, who presents with suicidal thoughts. He was released from prison 10 days ago and since then has been having thoughts of hurting himself. He doesn't have a specific plan. He has been drinking heavily \"30 beers a day\" since he was released, and starts feeling like he's withdrawing when he doesn't have a drink for a few hours. His last drink today was at 12 PM. He reports anxiety and tremors, no hallucinations. He's had multiple falls from stumbling while being drunk, no head trauma or LOC. ROS is notable for diffuse abdominal pain (constant worsened by drinking) and right back/flank pain. No dysuria or hematuria, no fevers, no chest pain or shortness of breath. 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 No pertinent surgical history. Home meds:  Prior to Admission medications    Medication Sig Start Date End Date Taking? Authorizing Provider   VENLAFAXINE HCL (EFFEXOR PO) Take  by mouth. Yes Janine Nelson MD   metoprolol (LOPRESSOR) 50 mg tablet Take 50 mg by mouth two (2) times a day. Yes Janine Nelson MD   LORazepam (ATIVAN) 1 mg tablet Take 1 Tab by mouth nightly as needed for Anxiety. Max Daily Amount: 1 mg. 3/4/17   Laureen Vera MD   ondansetron (ZOFRAN ODT) 4 mg disintegrating tablet Take 1 Tab by mouth every eight (8) hours as needed for Nausea.  8/17/16   Ynes Mccray MD   LORazepam (ATIVAN) 1 mg tablet Take 1 Tab by mouth every eight (8) hours as needed for Anxiety. Max Daily Amount: 3 mg. 12/10/15   Bettina Henriquez MD   zolpidem (AMBIEN) 10 mg tablet Take 1 Tab by mouth nightly as needed for Sleep. Max Daily Amount: 10 mg. 12/10/15   Bettina Henriquez MD       Allergies:   Allergies   Allergen Reactions    Risperidone Anaphylaxis    Cyclobenzaprine Unknown (comments)       FH:  Family History   Problem Relation Age of Onset   Arleen Myrick Cancer Mother      carcinoma that was metastaic to liver and brain    Heart Disease Father     Depression Father     Substance Abuse Maternal Grandfather     Depression Sister        SH:  Social History   Substance Use Topics    Smoking status: Former Smoker    Smokeless tobacco: Never Used    Alcohol use 8.0 oz/week     16 Shots of liquor per week      Comment: per pt\"2 quarts of liquor daily\"   Lately, he's drinking \"30 beers\" daily    ROS: Constitutional: negative  Eyes: negative  Ears, nose, mouth, throat, and face: negative  Respiratory: negative  Cardiovascular: negative  Gastrointestinal: negative except for abdominal pain  Genitourinary:negative  Integument/breast: negative  Hematologic/lymphatic: negative  Musculoskeletal:negative except for back pain  Neurological: negative  Behavioral/Psych: negative except for suicidal ideation, anxiety, bad mood, depression and excessive alcohol consumption  Endocrine: negative  Allergic/Immunologic: negative      PHYSICAL EXAM:  Visit Vitals    BP (!) 169/104 (BP 1 Location: Right arm, BP Patient Position: At rest)    Pulse (!) 108    Temp 98.5 °F (36.9 °C)    Resp 18    Ht 6' 3\" (1.905 m)    Wt 108.9 kg (240 lb)    SpO2 96%    BMI 30 kg/m2       Gen: NAD, non-toxic, unkempt  HEENT: anicteric sclerae, normal conjunctiva, oropharynx clear, MM dry  Neck: supple, trachea midline, no adenopathy  Heart: RR, tachycardic, no MRG, no JVD, no peripheral edema  Lungs: CTA b/l, non-labored respirations  Abd: soft, mild diffuse tenderness without rebound/guarding/rigidity, ND, BS+, no organomegaly  Extr: warm  Skin: dry, multiple excoriations and abrasions on extremities, b/l ecchymoses on both buttocks  Neuro: CN II-XII grossly intact, normal speech, moves all extremities  Psych: depressed mood, flat affect, anxious at times      Labs/Imaging:  Recent Results (from the past 24 hour(s))   DRUG SCREEN, URINE    Collection Time: 06/18/17  1:25 PM   Result Value Ref Range    AMPHETAMINE NEGATIVE  NEG      BARBITURATES POSITIVE (A) NEG      BENZODIAZEPINE NEGATIVE  NEG      COCAINE NEGATIVE  NEG      METHADONE NEGATIVE  NEG      OPIATES NEGATIVE  NEG      PCP(PHENCYCLIDINE) NEGATIVE  NEG      THC (TH-CANNABINOL) NEGATIVE  NEG      Drug screen comment (NOTE)    METABOLIC PANEL, COMPREHENSIVE    Collection Time: 06/18/17  1:25 PM   Result Value Ref Range    Sodium 125 (L) 136 - 145 mmol/L    Potassium 4.0 3.5 - 5.1 mmol/L    Chloride 90 (L) 97 - 108 mmol/L    CO2 20 (L) 21 - 32 mmol/L    Anion gap 15 5 - 15 mmol/L    Glucose 89 65 - 100 mg/dL    BUN 7 6 - 20 MG/DL    Creatinine 0.79 0.70 - 1.30 MG/DL    BUN/Creatinine ratio 9 (L) 12 - 20      GFR est AA >60 >60 ml/min/1.73m2    GFR est non-AA >60 >60 ml/min/1.73m2    Calcium 8.4 (L) 8.5 - 10.1 MG/DL    Bilirubin, total 0.7 0.2 - 1.0 MG/DL    ALT (SGPT) 31 12 - 78 U/L    AST (SGOT) 40 (H) 15 - 37 U/L    Alk.  phosphatase 135 (H) 45 - 117 U/L    Protein, total 7.0 6.4 - 8.2 g/dL    Albumin 3.4 (L) 3.5 - 5.0 g/dL    Globulin 3.6 2.0 - 4.0 g/dL    A-G Ratio 0.9 (L) 1.1 - 2.2     CBC WITH AUTOMATED DIFF    Collection Time: 06/18/17  1:25 PM   Result Value Ref Range    WBC 11.7 (H) 4.1 - 11.1 K/uL    RBC 4.53 4.10 - 5.70 M/uL    HGB 13.7 12.1 - 17.0 g/dL    HCT 38.2 36.6 - 50.3 %    MCV 84.3 80.0 - 99.0 FL    MCH 30.2 26.0 - 34.0 PG    MCHC 35.9 30.0 - 36.5 g/dL    RDW 13.3 11.5 - 14.5 %    PLATELET 801 483 - 131 K/uL    NEUTROPHILS 74 32 - 75 %    LYMPHOCYTES 17 12 - 49 %    MONOCYTES 9 5 - 13 %    EOSINOPHILS 0 0 - 7 % BASOPHILS 0 0 - 1 %    ABS. NEUTROPHILS 8.5 (H) 1.8 - 8.0 K/UL    ABS. LYMPHOCYTES 2.0 0.8 - 3.5 K/UL    ABS. MONOCYTES 1.1 (H) 0.0 - 1.0 K/UL    ABS. EOSINOPHILS 0.0 0.0 - 0.4 K/UL    ABS. BASOPHILS 0.0 0.0 - 0.1 K/UL   ETHYL ALCOHOL    Collection Time: 06/18/17  1:25 PM   Result Value Ref Range    ALCOHOL(ETHYL),SERUM 153 (H) <10 MG/DL   ACETAMINOPHEN    Collection Time: 06/18/17  1:25 PM   Result Value Ref Range    Acetaminophen level <2 (L) 10 - 30 ug/mL   SALICYLATE    Collection Time: 06/18/17  1:25 PM   Result Value Ref Range    SALICYLATE <5.3 (L) 2.8 - 20.0 MG/DL   LIPASE    Collection Time: 06/18/17  1:25 PM   Result Value Ref Range    Lipase 114 73 - 393 U/L   URINALYSIS W/MICROSCOPIC    Collection Time: 06/18/17  1:25 PM   Result Value Ref Range    Color YELLOW/STRAW      Appearance CLEAR CLEAR      Specific gravity 1.006 1.003 - 1.030      pH (UA) 6.0 5.0 - 8.0      Protein NEGATIVE  NEG mg/dL    Glucose NEGATIVE  NEG mg/dL    Ketone 15 (A) NEG mg/dL    Bilirubin NEGATIVE  NEG      Blood NEGATIVE  NEG      Urobilinogen 0.2 0.2 - 1.0 EU/dL    Nitrites NEGATIVE  NEG      Leukocyte Esterase NEGATIVE  NEG      WBC 0-4 0 - 4 /hpf    RBC 0-5 0 - 5 /hpf    Epithelial cells FEW FEW /lpf    Bacteria NEGATIVE  NEG /hpf       Recent Labs      06/18/17   1325   WBC  11.7*   HGB  13.7   HCT  38.2   PLT  211     Recent Labs      06/18/17   1325   NA  125*   K  4.0   CL  90*   CO2  20*   BUN  7   CREA  0.79   GLU  89   CA  8.4*     Recent Labs      06/18/17   1325   SGOT  40*   ALT  31   AP  135*   TBILI  0.7   TP  7.0   ALB  3.4*   GLOB  3.6   LPSE  114     CT abdomen: Right renal subcapsular hematoma. Suggestion of bilateral lower lobe pulmonary  emboli although the bolus is not optimal for evaluation. Recommend further evaluation with chest CTA. Hepatic steatosis.           Assessment & Plan: this is a 42 y.o man with alcohol abuse, HTN, history of PE in 2014, depression and anxiety, who presents with suicidal ideations and alcohol withdrawal.     Alcohol abuse with withdrawal: (POA) exhibiting withdrawal symptoms despite drinking 4 hours ago. Last drink 12 PM 6/18  -load with diazepam, monitor with CIWA  -provide goodie bag  -aggressive electrolyte repletion as needed  -counseled him on alcohol cessation, would reinforce this when his mood is improved    Hyponatremia: (POA) this is likely due to poor solute intake/beer potomania  -check urine sodium and osms  -was bolused in the ED with NS, will check sodium now and if rapidly increasing may need to place on hypotonic fluids    Right renal hematoma: (POA)  -consult urology    Bilateral pulmonary emboli: (POA) these were present in 2014, no chest pain or hypoxia currently. Tachycardia is likely due to the other problems.   -hold off on CTA of the chest since he just got a contrast load; can consider getting it in 24-48 hours  -no anticoagulation for now given renal hematoma    Depression and anxiety w/ suicidal ideation:  -place with 1:1 sitter  -consult psychiatry    HTN: likely confounded by alcohol withdrawl  -resume home metoprolol    Frequent falls: due to alcohol abuse  -fall precautions for now  -will re-evaluate with PT once he is more stable    DVT ppx: no anticoagulants for now  Code status: full  Disposition: admit to [place] home when medically appropriate    Signed By: Jaleel Smith MD     June 18, 2017

## 2017-06-19 LAB
ANION GAP BLD CALC-SCNC: 7 MMOL/L (ref 5–15)
BUN SERPL-MCNC: 8 MG/DL (ref 6–20)
BUN/CREAT SERPL: 8 (ref 12–20)
CALCIUM SERPL-MCNC: 8.3 MG/DL (ref 8.5–10.1)
CHLORIDE SERPL-SCNC: 101 MMOL/L (ref 97–108)
CO2 SERPL-SCNC: 26 MMOL/L (ref 21–32)
CREAT SERPL-MCNC: 0.95 MG/DL (ref 0.7–1.3)
ERYTHROCYTE [DISTWIDTH] IN BLOOD BY AUTOMATED COUNT: 14 % (ref 11.5–14.5)
GLUCOSE SERPL-MCNC: 99 MG/DL (ref 65–100)
HCT VFR BLD AUTO: 39.4 % (ref 36.6–50.3)
HGB BLD-MCNC: 13.5 G/DL (ref 12.1–17)
MAGNESIUM SERPL-MCNC: 2.7 MG/DL (ref 1.6–2.4)
MCH RBC QN AUTO: 29.7 PG (ref 26–34)
MCHC RBC AUTO-ENTMCNC: 34.3 G/DL (ref 30–36.5)
MCV RBC AUTO: 86.6 FL (ref 80–99)
PHOSPHATE SERPL-MCNC: 3.5 MG/DL (ref 2.6–4.7)
PLATELET # BLD AUTO: 183 K/UL (ref 150–400)
POTASSIUM SERPL-SCNC: 3.8 MMOL/L (ref 3.5–5.1)
RBC # BLD AUTO: 4.55 M/UL (ref 4.1–5.7)
SODIUM SERPL-SCNC: 134 MMOL/L (ref 136–145)
WBC # BLD AUTO: 9.3 K/UL (ref 4.1–11.1)

## 2017-06-19 PROCEDURE — 83735 ASSAY OF MAGNESIUM: CPT | Performed by: HOSPITALIST

## 2017-06-19 PROCEDURE — 74011250636 HC RX REV CODE- 250/636: Performed by: HOSPITALIST

## 2017-06-19 PROCEDURE — 80048 BASIC METABOLIC PNL TOTAL CA: CPT | Performed by: HOSPITALIST

## 2017-06-19 PROCEDURE — 74011250636 HC RX REV CODE- 250/636: Performed by: INTERNAL MEDICINE

## 2017-06-19 PROCEDURE — 74011000250 HC RX REV CODE- 250: Performed by: HOSPITALIST

## 2017-06-19 PROCEDURE — 36415 COLL VENOUS BLD VENIPUNCTURE: CPT | Performed by: HOSPITALIST

## 2017-06-19 PROCEDURE — 84100 ASSAY OF PHOSPHORUS: CPT | Performed by: HOSPITALIST

## 2017-06-19 PROCEDURE — 74011250637 HC RX REV CODE- 250/637: Performed by: HOSPITALIST

## 2017-06-19 PROCEDURE — 85027 COMPLETE CBC AUTOMATED: CPT | Performed by: HOSPITALIST

## 2017-06-19 PROCEDURE — 74011000258 HC RX REV CODE- 258: Performed by: HOSPITALIST

## 2017-06-19 PROCEDURE — 65660000000 HC RM CCU STEPDOWN

## 2017-06-19 RX ORDER — VENLAFAXINE 37.5 MG/1
75 TABLET ORAL
Status: DISCONTINUED | OUTPATIENT
Start: 2017-06-19 | End: 2017-06-22 | Stop reason: HOSPADM

## 2017-06-19 RX ORDER — VENLAFAXINE 37.5 MG/1
150 TABLET ORAL
Status: DISCONTINUED | OUTPATIENT
Start: 2017-06-20 | End: 2017-06-22 | Stop reason: HOSPADM

## 2017-06-19 RX ORDER — SODIUM CHLORIDE 450 MG/100ML
75 INJECTION, SOLUTION INTRAVENOUS CONTINUOUS
Status: DISCONTINUED | OUTPATIENT
Start: 2017-06-19 | End: 2017-06-19

## 2017-06-19 RX ORDER — SODIUM CHLORIDE 0.9 % (FLUSH) 0.9 %
5-10 SYRINGE (ML) INJECTION AS NEEDED
Status: DISCONTINUED | OUTPATIENT
Start: 2017-06-19 | End: 2017-06-22 | Stop reason: HOSPADM

## 2017-06-19 RX ORDER — VENLAFAXINE 37.5 MG/1
150 TABLET ORAL
Status: DISCONTINUED | OUTPATIENT
Start: 2017-06-20 | End: 2017-06-19

## 2017-06-19 RX ADMIN — Medication 2 MG: at 07:26

## 2017-06-19 RX ADMIN — SODIUM CHLORIDE 75 ML/HR: 4.5 INJECTION, SOLUTION INTRAVENOUS at 01:01

## 2017-06-19 RX ADMIN — PROCHLORPERAZINE EDISYLATE 10 MG: 5 INJECTION INTRAMUSCULAR; INTRAVENOUS at 00:58

## 2017-06-19 RX ADMIN — DIAZEPAM 10 MG: 5 INJECTION, SOLUTION INTRAMUSCULAR; INTRAVENOUS at 02:08

## 2017-06-19 RX ADMIN — FOLIC ACID: 5 INJECTION, SOLUTION INTRAMUSCULAR; INTRAVENOUS; SUBCUTANEOUS at 19:48

## 2017-06-19 RX ADMIN — METOPROLOL TARTRATE 50 MG: 50 TABLET ORAL at 08:59

## 2017-06-19 RX ADMIN — METOPROLOL TARTRATE 50 MG: 50 TABLET ORAL at 17:12

## 2017-06-19 RX ADMIN — VENLAFAXINE 75 MG: 37.5 TABLET ORAL at 20:17

## 2017-06-19 RX ADMIN — Medication 10 ML: at 07:26

## 2017-06-19 RX ADMIN — Medication 2 MG: at 13:08

## 2017-06-19 RX ADMIN — Medication 2 MG: at 00:06

## 2017-06-19 NOTE — PROGRESS NOTES
Problem: Falls - Risk of  Goal: *Absence of falls  Outcome: Progressing Towards Goal  Patient is free of falls at this time. Fall prevention performed and patient instructed to call for assistance using call bell as needed. Will continue to monitor. Goal: *Knowledge of fall prevention  Outcome: Progressing Towards Goal  Patient is free of falls at this time. Fall prevention performed and patient instructed to call for assistance using call bell as needed. Will continue to monitor. Problem: Pressure Injury - Risk of  Goal: *Prevention of pressure ulcer  Outcome: Progressing Towards Goal  Patient free of signs and symptoms of pressure ulcers at this time. Pressure ulcer prevention performed. Will continue to monitor.

## 2017-06-19 NOTE — PROGRESS NOTES
Hospitalist Progress Note  Peter Monahan MD  Office: [phone]  Cell: [phone]      Date of Service:  2017  NAME:  Kia Carrera  :  1975  MRN:  [id number]      Admission Summary:   41 yo male with PMHx of Alcohol abuse, HTN, PE, DDD, admitted with intoxication and Rt flank pain. Interval history / Subjective:     Pt seen and examined  Tremulous  C/o Rt flank pain     Assessment & Plan:     Alcohol abuse with withdrawal: (POA) exhibiting withdrawal symptoms despite drinking 4 hours ago. Last drink 12 PM  high risk DTs,  -load with diazepam, monitor with CIWA  -provide goodie bag  -aggressive electrolyte repletion as needed  -counseled him on alcohol cessation, would reinforce this when his mood is improved     Hyponatremia: (POA) this is likely due to poor solute intake/beer potomania  -check urine sodium and osms  -was bolused in the ED with NS  -d/c fluids     Right renal hematoma: (POA), traumatic  -consult urology, repeat CT in few days      Bilateral pulmonary emboli, Chronic: (POA) these were present in , no chest pain or hypoxia currently. Tachycardia is likely due to the other problems.   -hold off on CTA of the chest since he just got a contrast load; can consider getting it in 24-48 hours  -no anticoagulation for now given renal hematoma  - consider IVC Filter placement, but patient not interested in this procedure     Depression and anxiety w/ suicidal ideation:  -place with 1:1 sitter  -consult psychiatry pending     HTN: likely confounded by alcohol withdrawl  -resume home metoprolol     Frequent falls: due to alcohol abuse  -fall precautions for now  -will re-evaluate with PT once he is more stable    Code status: FULL  DVT prophylaxis:SCDs    Care Plan discussed with: Patient/Family and Nurse  Disposition: Home w/Family     Hospital Problems  Date Reviewed: 10/9/2014          Codes Class Noted POA    * (Principal)Hyponatremia ICD-10-CM: E87.1  ICD-9-CM: 276.1  6/18/2017 Unknown        Renal hematoma ICD-10-CM: S37.019A  ICD-9-CM: 866.01  6/18/2017 Unknown        Alcohol use disorder, severe, dependence ([facility] [address] ICD-10-CM: F10.20  ICD-9-CM: 303.90  7/28/2015 Yes        Depression, major ICD-10-CM: F32.9  ICD-9-CM: 296.20  7/27/2015 Yes        Pulmonary embolism (HCC) ICD-10-CM: I26.99  ICD-9-CM: 415.19  10/9/2014 Yes        Suicidal ideation ICD-10-CM: R45.851  ICD-9-CM: V62.84  10/5/2014 Yes        Anxiety disorder ICD-10-CM: F41.9  ICD-9-CM: 300.00  Unknown Yes        Psychiatric disorder ICD-10-CM: F99  ICD-9-CM: 300.9  Unknown Yes    Overview Signed 11/21/2011 11:35 AM by Stanislaw Arzola LPN     depression                     Review of Systems:   A comprehensive review of systems was negative except for that written in the HPI. Vital Signs:    Last 24hrs VS reviewed since prior progress note. Most recent are:  Visit Vitals    /89 (BP 1 Location: Right arm, BP Patient Position: At rest)    Pulse 89    Temp 99.1 °F (37.3 °C)    Resp 18    Ht 6' 3\" (1.905 m)    Wt 107.6 kg (237 lb 3.4 oz)    SpO2 95%    BMI 29.65 kg/m2         Intake/Output Summary (Last 24 hours) at 06/19/17 1533  Last data filed at 06/19/17 1145   Gross per 24 hour   Intake          1849.59 ml   Output             4375 ml   Net         -2525.41 ml        Physical Examination:             Constitutional:  No acute distress, cooperative, pleasant , mildly tremulous   ENT:  Oral mucous moist, oropharynx benign. Neck supple,    Resp:  CTA bilaterally. CV:  Regular rhythm, normal rate, no murmurs, gallops, rubs    GI:  Soft, non distended. +Tenderness,  normoactive bowel sounds    Musculoskeletal:  No edema, warm, 2+ pulses throughout    Neurologic:  Moves all extremities.   AAOx3     Skin:  ecchymosisi b/l elbows       Data Review:    Review and/or order of clinical lab test  Review and/or order of tests in the radiology section of [facility]  Review and/or order of tests in the medicine section of [facility]      Labs:     Recent Labs      06/19/17   0512  06/18/17   1325   WBC  9.3  11.7*   HGB  13.5  13.7   HCT  39.4  38.2   PLT  183  211     Recent Labs      06/19/17   0512  06/18/17   1826  06/18/17   1325   NA  134*  132*  125*   K  3.8   --   4.0   CL  101   --   90*   CO2  26   --   20*   BUN  8   --   7   CREA  0.95   --   0.79   GLU  99   --   89   CA  8.3*   --   8.4*   MG  2.7*   --   2.0   PHOS  3.5   --   3.0     Recent Labs      06/18/17   1325   SGOT  40*   ALT  31   AP  135*   TBILI  0.7   TP  7.0   ALB  3.4*   GLOB  3.6   LPSE  114     No results for input(s): INR, PTP, APTT in the last 72 hours. No lab exists for component: INREXT   No results for input(s): FE, TIBC, PSAT, FERR in the last 72 hours. No results found for: FOL, RBCF   No results for input(s): PH, PCO2, PO2 in the last 72 hours. No results for input(s): CPK, CKNDX, TROIQ in the last 72 hours.     No lab exists for component: CPKMB  Lab Results   Component Value Date/Time    Cholesterol, total 155 07/29/2015 05:23 AM    HDL Cholesterol 63 07/29/2015 05:23 AM    LDL, calculated 71.6 07/29/2015 05:23 AM    Triglyceride 102 07/29/2015 05:23 AM    CHOL/HDL Ratio 2.5 07/29/2015 05:23 AM     Lab Results   Component Value Date/Time    Glucose (POC) 102 07/27/2015 01:22 PM    Glucose (POC) 214 03/25/2015 01:30 PM    Glucose (POC) 87 09/23/2011 05:25 AM    Glucose (POC) 78 09/23/2011 04:09 AM    Glucose (POC) 115 09/23/2011 01:10 AM    Glucose (POC) 56 09/23/2011 12:33 AM     Lab Results   Component Value Date/Time    Color YELLOW/STRAW 06/18/2017 01:25 PM    Appearance CLEAR 06/18/2017 01:25 PM    Specific gravity 1.006 06/18/2017 01:25 PM    pH (UA) 6.0 06/18/2017 01:25 PM    Protein NEGATIVE  06/18/2017 01:25 PM    Glucose NEGATIVE  06/18/2017 01:25 PM    Ketone 15 06/18/2017 01:25 PM    Bilirubin NEGATIVE  06/18/2017 01:25 PM    Urobilinogen 0.2 06/18/2017 01:25 PM Nitrites NEGATIVE  06/18/2017 01:25 PM    Leukocyte Esterase NEGATIVE  06/18/2017 01:25 PM    Epithelial cells FEW 06/18/2017 01:25 PM    Bacteria NEGATIVE  06/18/2017 01:25 PM    WBC 0-4 06/18/2017 01:25 PM    RBC 0-5 06/18/2017 01:25 PM         Medications Reviewed:     Current Facility-Administered Medications   Medication Dose Route Frequency    sodium chloride (NS) flush 5-10 mL  5-10 mL IntraVENous PRN    metoprolol tartrate (LOPRESSOR) tablet 50 mg  50 mg Oral BID    ondansetron (ZOFRAN ODT) tablet 4 mg  4 mg Oral Q8H PRN    0.9% sodium chloride 1,000 mL with mvi, adult no. 4 with vit K 10 mL, thiamine 401 mg, folic acid 1 mg infusion   IntraVENous Q24H    diazePAM (VALIUM) injection 10 mg  10 mg IntraVENous Q1H PRN    diazePAM (VALIUM) injection 20 mg  20 mg IntraVENous Q1H PRN    prochlorperazine (COMPAZINE) injection 10 mg  10 mg IntraMUSCular Q6H PRN    hydrALAZINE (APRESOLINE) 20 mg/mL injection 20 mg  20 mg IntraVENous Q6H PRN    acetaminophen (TYLENOL) tablet 650 mg  650 mg Oral Q6H PRN    morphine injection 2 mg  2 mg IntraVENous Q4H PRN     ______________________________________________________________________  EXPECTED LENGTH OF STAY: 3d 9h  ACTUAL LENGTH OF STAY:          1                 Rosibel Solomon MD

## 2017-06-19 NOTE — PROGRESS NOTES
06/18/17 1935   Vitals   Temp 99 °F (37.2 °C)   Temp Source Oral   Pulse (Heart Rate) (!) 138   Heart Rate Source Monitor   Resp Rate 24   O2 Sat (%) 93 %   Level of Consciousness Alert   BP (!) 173/102   MAP (Calculated) 126   BP 1 Location Left arm   BP 1 Method Automatic   BP Patient Position Sitting   Cardiac Rhythm Sinus Tach   MEWS Score 5     MD paged, will continue to monitor closely. Medications recently given, will recheck.

## 2017-06-19 NOTE — PROGRESS NOTES
Na uptrending a bit too fast will change NS to 0.45% NS to slightly reverse this and re-evaluate tomorrow.
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
## 2017-06-19 NOTE — PROGRESS NOTES
Spiritual Care Partner Volunteer visited patient in [place] [address] on 6/19/17. Documented by:  Christiano Morejon M.Div.    Paging Service [phone])

## 2017-06-19 NOTE — CONSULTS
Urology Consult    Patient: Kianna Valdes MRN: [id number]  SSN: [id number]    YOB: 1975  Age: 42 y.o. Sex: male          Date of Consultation:  June 19, 2017  Requesting Physician: Hay Ramos MD  Reason for Consultation:    Pre-existing [facility] Patient:   Physician: Dr. Barb Hatfield dictated [phone]    Assessment/Plan:   R flank trauma with R renal subcapsular hematoma. Follow Hgb and manage HTN. Repeat CT in a few days.      Signed By: Aleksandar Keith MD  - June 19, 2017

## 2017-06-19 NOTE — PROGRESS NOTES
Problem: Falls - Risk of  Goal: *Absence of falls  Outcome: Progressing Towards Goal  Call bell within reach, personal belongings in reach, bed locked and in low position. Non skid footwear in placed. Goal: *Knowledge of fall prevention  Outcome: Progressing Towards Goal  Uses the call bell appropriately to call for assistance         0006: Patient complained about Roxicodone not helping for the abdomen pain , RN notified MD Mendel Carrie, ordered received to discontinue  Roxicodone 5 mg.   New ordered received  for 2 mg IV Morphine Q4hrs

## 2017-06-19 NOTE — CONSULTS
PSYCHIATRIC CONSULTATION                         IDENTIFICATION:    Patient Name  Bea Roman   Date of Birth 1975   [facility] [id number]   Medical Record Number  [id number]      Age  42 y.o. PCP Bruno Bolton MD   Admit date:  6/18/2017    Room Number  466/01  @ [facility]   Date of Service  6/19/2017            HISTORY         REASON FOR HOSPITALIZATION/CONSULTATION:  Admitted with alcohol intoxication and concern about withdrawal also stating suicidal thoughts  CC: \"not sure there is a differeny future and still with suicidal thinking\"    HISTORY OF PRESENT ILLNESS:    Pt with dx of schizoaffective disorder 3 years ago at [facility] made by good clinician(personal knowledge). Staes alcohol abuse through his adult years, no other drugs. Recent hx of custodial, detoxes and feeling he cannot regain control of addiction which he said had a 7 yr sobriety within the past 12 years or so. Currently still concerned about hopeless future and stating he is helpless about controlling alcohol use. States he is glad to have morphine for renal bruise to make him feel better. Has had on 30 day program at [facility] which he found effective  Pt is not a reliable historian and not sure if he has any bridges he hasn't burned. Also even with my respect for [place] psychiatrist his description of his psychiatric status, current and past, does not fully jive with schizoaffective disorde. 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  metoprolol (LOPRESSOR) 50 mg tablet Take 50 mg by mouth two (2) times a day.  LORazepam (ATIVAN) 1 mg tablet Take 1 Tab by mouth nightly as needed for Anxiety. Max Daily Amount: 1 mg.     ondansetron (ZOFRAN ODT) 4 mg disintegrating tablet Take 1 Tab by mouth every eight (8) hours as needed for Nausea.  LORazepam (ATIVAN) 1 mg tablet Take 1 Tab by mouth every eight (8) hours as needed for Anxiety. Max Daily Amount: 3 mg.  zolpidem (AMBIEN) 10 mg tablet Take 1 Tab by mouth nightly as needed for Sleep. Max Daily Amount: 10 mg. PAST MEDICAL HISTORY:as below with alcoholism, and Cluster A personality disorder best diagnoses  Active Ambulatory Problems     Diagnosis Date Noted    Delusional disorder ([facility] 09/28/2011    Hallucinations 09/28/2011    Anxiety disorder     Mood disorder ([address]     Psychiatric disorder     Other ill-defined conditions     DDD (degenerative disc disease)     Sciatica     Alcohol dependence ([address] 10/05/2014    Suicidal ideation 10/05/2014    Pulmonary embolism ([address] 10/09/2014    Substance induced mood disorder ([address] 03/26/2015    Depression, major 07/27/2015    Alcohol use disorder, severe, dependence ([address] 07/28/2015     Resolved Ambulatory Problems     Diagnosis Date Noted    Confusion 09/19/2011    Bipolar I disorder, most recent episode (or current) manic, severe, specified as with psychotic behavior 09/19/2011    Confusion state 09/26/2011    Disorientation 09/26/2011     Past Medical History:   Diagnosis Date    Alcohol abuse     Anxiety disorder     DDD (degenerative disc disease)     Depression     Hypertension     Mood disorder ([address]     Other ill-defined conditions     PE (pulmonary embolism) 10/9/2014    Psychiatric disorder     Sciatica     Suicidal thoughts       Past Medical History:   Diagnosis Date    Alcohol abuse     Anxiety disorder     DDD (degenerative disc disease)     Depression     Hypertension     Mood disorder ([address]     Other ill-defined conditions     sciatica, 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  No pertinent surgical history. SOCIAL HISTORY: as below. Social History     Social History Narrative    Pt reports he is , unemployed (on disability.) Continues to have legal problems related to harassment of various family members. Numerous legal problems related to alcohol use.      Social History   Substance Use Topics    Smoking status: Former Smoker    Smokeless tobacco: Never Used    Alcohol use 8.0 oz/week     16 Shots of liquor per week      Comment: per pt\"2 quarts of liquor daily\"      FAMILY HISTORY: grandparents on both sides alcoholics  Family History   Problem Relation Age of Onset   [facility] Cancer Mother      carcinoma that was metastaic to liver and brain    Heart Disease Father     Depression Father     Substance Abuse Maternal Grandfather     Depression Sister        REVIEW of SYSTEMS:   Flank pain consistent for kidney bruise             MENTAL STATUS EXAM & VITALS       MENTAL STATUS EXAM:    FINDINGS WITHIN NORMAL LIMITS (WNL) UNLESS OTHERWISE STATED BELOW:    Orientation oriented to time, place and person   Vital Signs (BP,Pulse, Temp) See below (reviewed 6/19/2017)   Gait and Station Within normal limits   Abnormal Muscular Movements/Tone/Behavior No EPS, no Tardive Dyskinesia, no abnormal muscular movements; wnl tone   Relations cooperative but not fully reliable   General Appearance:  age appropriate and within normal Limits   Language No aphasia or dysarthria   Speech:  normal pitch, normal volume and non-pressured   Thought Processes logical, wnl rate of thoughts, good abstract reasoning and computation   Thought Associations goal directed   Thought Content free of delusions, free of hallucinations and not internally preoccupied    Suicidal Ideations thoughts, not immediate plan   Homicidal Ideations none   Mood:  downbeat   Affect:  full range and sad   Memory recent  adequate   Memory remote:  adequate   Concentration/Attention:  adequate   Fund of Knowledge Fair/average   Insight:  {Psych insight & judgement:fair for psychiatric topics and ok for general picture   Reliability fair   Judgment:  As above          VITALS:     Patient Vitals for the past 24 hrs:   Temp Pulse Resp BP SpO2   06/19/17 1541 98.6 °F (37 °C) 86 18 138/86 97 %   06/19/17 1229 99.1 °F (37.3 °C) 89 18 142/89 95 %   06/19/17 0721 98.1 °F (36.7 °C) 74 18 (!) 162/98 95 %   06/19/17 0317 98.3 °F (36.8 °C) 84 18 166/88 98 %   06/18/17 2331 99.1 °F (37.3 °C) 84 20 (!) 152/99 99 %   06/18/17 2228 98.8 °F (37.1 °C) 84 20 (!) 162/98 99 %   06/18/17 2129 - 90 - 143/89 -   06/18/17 2052 - 95 20 - -   06/18/17 2021 - (!) 107 22 (!) 140/94 -   06/18/17 2007 - (!) 109 - (!) 180/97 -   06/18/17 2002 - (!) 110 - - -   06/18/17 1956 99.7 °F (37.6 °C) (!) 114 22 (!) 128/100 94 %   06/18/17 1948 - (!) 121 - 170/88 -   06/18/17 1941 - (!) 123 - (!) 174/100 94 %   06/18/17 1937 - (!) 115 - - -   06/18/17 1935 99 °F (37.2 °C) (!) 138 24 (!) 173/102 93 %   06/18/17 1926 - (!) 128 - - -   06/18/17 1922 - (!) 114 - (!) 175/95 -   06/18/17 1855 - (!) 118 - - -   06/18/17 1846 98.9 °F (37.2 °C) (!) 135 20 112/85 95 %   06/18/17 1720 98.1 °F (36.7 °C) (!) 110 18 (!) 158/93 95 %              DATA     LABORATORY DATA:  Labs Reviewed   DRUG SCREEN, URINE - Abnormal; Notable for the following:        Result Value    BARBITURATES POSITIVE (*)     All other components within normal limits   METABOLIC PANEL, COMPREHENSIVE - Abnormal; Notable for the following:     Sodium 125 (*)     Chloride 90 (*)     CO2 20 (*)     BUN/Creatinine ratio 9 (*)     Calcium 8.4 (*)     AST (SGOT) 40 (*)     Alk. phosphatase 135 (*)     Albumin 3.4 (*)     A-G Ratio 0.9 (*)     All other components within normal limits   CBC WITH AUTOMATED DIFF - Abnormal; Notable for the following:     WBC 11.7 (*)     ABS. NEUTROPHILS 8.5 (*)     ABS.  MONOCYTES 1.1 (*)     All other components within normal limits   ETHYL ALCOHOL - Abnormal; Notable for the following:     ALCOHOL(ETHYL),SERUM 153 (*)     All other components within normal limits   ACETAMINOPHEN - Abnormal; Notable for the following:     Acetaminophen level <2 (*)     All other components within normal limits   SALICYLATE - Abnormal; Notable for the following:     SALICYLATE <9.1 (*)     All other components within normal limits   URINALYSIS W/MICROSCOPIC - Abnormal; Notable for the following:     Ketone 15 (*)     All other components within normal limits   SODIUM - Abnormal; Notable for the following:     Sodium 132 (*)     All other components within normal limits   METABOLIC PANEL, BASIC - Abnormal; Notable for the following:     Sodium 134 (*)     BUN/Creatinine ratio 8 (*)     Calcium 8.3 (*)     All other components within normal limits   MAGNESIUM - Abnormal; Notable for the following:     Magnesium 2.7 (*)     All other components within normal limits   LIPASE   MAGNESIUM   PHOSPHORUS   SODIUM, UR, RANDOM   OSMOLALITY, UR   OSMOLALITY, SERUM/PLASMA   CBC W/O DIFF   PHOSPHORUS     Admission on 06/18/2017   Component Date Value Ref Range Status    AMPHETAMINE 06/18/2017 NEGATIVE   NEG   Final    BARBITURATES 06/18/2017 POSITIVE* NEG   Final    BENZODIAZEPINE 06/18/2017 NEGATIVE   NEG   Final    COCAINE 06/18/2017 NEGATIVE   NEG   Final    METHADONE 06/18/2017 NEGATIVE   NEG   Final    OPIATES 06/18/2017 NEGATIVE   NEG   Final    PCP(PHENCYCLIDINE) 06/18/2017 NEGATIVE   NEG   Final    THC (TH-CANNABINOL) 06/18/2017 NEGATIVE   NEG   Final    Drug screen comment 06/18/2017 (NOTE)   Final    Sodium 06/18/2017 125* 136 - 145 mmol/L Final    Potassium 06/18/2017 4.0  3.5 - 5.1 mmol/L Final    Chloride 06/18/2017 90* 97 - 108 mmol/L Final    CO2 06/18/2017 20* 21 - 32 mmol/L Final    Anion gap 06/18/2017 15  5 - 15 mmol/L Final    Glucose 06/18/2017 89  65 - 100 mg/dL Final    BUN 06/18/2017 7  6 - 20 MG/DL Final    Creatinine 06/18/2017 0.79  0.70 - 1.30 MG/DL Final    BUN/Creatinine ratio 06/18/2017 9* 12 - 20   Final    GFR est AA 06/18/2017 >60  >60 ml/min/1.73m2 Final    GFR est non-AA 06/18/2017 >60  >60 ml/min/1.73m2 Final    Calcium 06/18/2017 8.4* 8.5 - 10.1 MG/DL Final    Bilirubin, total 06/18/2017 0.7  0.2 - 1.0 MG/DL Final    ALT (SGPT) 06/18/2017 31  12 - 78 U/L Final    AST (SGOT) 06/18/2017 40* 15 - 37 U/L Final    Alk. phosphatase 06/18/2017 135* 45 - 117 U/L Final    Protein, total 06/18/2017 7.0  6.4 - 8.2 g/dL Final    Albumin 06/18/2017 3.4* 3.5 - 5.0 g/dL Final    Globulin 06/18/2017 3.6  2.0 - 4.0 g/dL Final    A-G Ratio 06/18/2017 0.9* 1.1 - 2.2   Final    WBC 06/18/2017 11.7* 4.1 - 11.1 K/uL Final    RBC 06/18/2017 4.53  4.10 - 5.70 M/uL Final    HGB 06/18/2017 13.7  12.1 - 17.0 g/dL Final    HCT 06/18/2017 38.2  36.6 - 50.3 % Final    MCV 06/18/2017 84.3  80.0 - 99.0 FL Final    MCH 06/18/2017 30.2  26.0 - 34.0 PG Final    MCHC 06/18/2017 35.9  30.0 - 36.5 g/dL Final    RDW 06/18/2017 13.3  11.5 - 14.5 % Final    PLATELET 68/29/5101 289  150 - 400 K/uL Final    NEUTROPHILS 06/18/2017 74  32 - 75 % Final    LYMPHOCYTES 06/18/2017 17  12 - 49 % Final    MONOCYTES 06/18/2017 9  5 - 13 % Final    EOSINOPHILS 06/18/2017 0  0 - 7 % Final    BASOPHILS 06/18/2017 0  0 - 1 % Final    ABS. NEUTROPHILS 06/18/2017 8.5* 1.8 - 8.0 K/UL Final    ABS. LYMPHOCYTES 06/18/2017 2.0  0.8 - 3.5 K/UL Final    ABS. MONOCYTES 06/18/2017 1.1* 0.0 - 1.0 K/UL Final    ABS. EOSINOPHILS 06/18/2017 0.0  0.0 - 0.4 K/UL Final    ABS.  BASOPHILS 06/18/2017 0.0  0.0 - 0.1 K/UL Final    ALCOHOL(ETHYL),SERUM 06/18/2017 153* <10 MG/DL Final    Acetaminophen level 06/18/2017 <2* 10 - 30 ug/mL Final    SALICYLATE 90/58/6282 <7.2* 2.8 - 20.0 MG/DL Final    Lipase 06/18/2017 114  73 - 393 U/L Final    Color 06/18/2017 YELLOW/STRAW    Final    Appearance 06/18/2017 CLEAR  CLEAR   Final    Specific gravity 06/18/2017 1.006  1.003 - 1.030   Final    pH (UA) 06/18/2017 6.0  5.0 - 8.0   Final    Protein 06/18/2017 NEGATIVE   NEG mg/dL Final    Glucose 06/18/2017 NEGATIVE   NEG mg/dL Final    Ketone 06/18/2017 15* NEG mg/dL Final    Bilirubin 06/18/2017 NEGATIVE   NEG   Final    Blood 06/18/2017 NEGATIVE   NEG   Final    Urobilinogen 06/18/2017 0.2  0.2 - 1.0 EU/dL Final    Nitrites 06/18/2017 NEGATIVE   NEG   Final    Leukocyte Esterase 06/18/2017 NEGATIVE   NEG   Final    WBC 06/18/2017 0-4  0 - 4 /hpf Final    RBC 06/18/2017 0-5  0 - 5 /hpf Final    Epithelial cells 06/18/2017 FEW  FEW /lpf Final    Bacteria 06/18/2017 NEGATIVE   NEG /hpf Final    Magnesium 06/18/2017 2.0  1.6 - 2.4 mg/dL Final    Phosphorus 06/18/2017 3.0  2.6 - 4.7 MG/DL Final    Sodium urine, random 06/18/2017 24  MMOL/L Final    Osmolality,urine 06/18/2017 102  MOSM/kg H2O Final    Osmolality, serum/plasma 06/18/2017 308  mOsm/kg H2O Final    Sodium 06/18/2017 132* 136 - 145 mmol/L Final    WBC 06/19/2017 9.3  4.1 - 11.1 K/uL Final    RBC 06/19/2017 4.55  4.10 - 5.70 M/uL Final    HGB 06/19/2017 13.5  12.1 - 17.0 g/dL Final    HCT 06/19/2017 39.4  36.6 - 50.3 % Final    MCV 06/19/2017 86.6  80.0 - 99.0 FL Final    MCH 06/19/2017 29.7  26.0 - 34.0 PG Final    MCHC 06/19/2017 34.3  30.0 - 36.5 g/dL Final    RDW 06/19/2017 14.0  11.5 - 14.5 % Final    PLATELET 18/79/0776 697  150 - 400 K/uL Final    Sodium 06/19/2017 134* 136 - 145 mmol/L Final    Potassium 06/19/2017 3.8  3.5 - 5.1 mmol/L Final    Chloride 06/19/2017 101  97 - 108 mmol/L Final    CO2 06/19/2017 26  21 - 32 mmol/L Final    Anion gap 06/19/2017 7  5 - 15 mmol/L Final    Glucose 06/19/2017 99  65 - 100 mg/dL Final    BUN 06/19/2017 8  6 - 20 MG/DL Final    Creatinine 06/19/2017 0.95  0.70 - 1.30 MG/DL Final    BUN/Creatinine ratio 06/19/2017 8* 12 - 20   Final    GFR est AA 06/19/2017 >60  >60 ml/min/1.73m2 Final    GFR est non-AA 06/19/2017 >60  >60 ml/min/1.73m2 Final    Calcium 06/19/2017 8.3* 8.5 - 10.1 MG/DL Final    Magnesium 06/19/2017 2.7* 1.6 - 2.4 mg/dL Final    Phosphorus 06/19/2017 3.5  2.6 - 4.7 MG/DL Final        RADIOLOGY REPORTS:    Results from Hospital Encounter encounter on 03/25/11   XR CHEST PORTABLE   Narrative **Final Report**      ICD Codes / Adm. Diagnosis:    / Schizoaffective Disorder  Schizoaffective   Disorder  Examination:  CR CHEST PORT  - [id number] - Mar 25 2011  8:56AM  Accession No:  [id number]  Reason:  dx pneumonia on 3/19/11      REPORT:  Indication: Followup pneumonia    Portable exam of the chest obtained at 845 demonstrates normal heart size. Lingular infiltrate is noted. The osseous structures are unremarkable. IMPRESSION: Lingular infiltrate. Interpreting/Reading Doctor: Cynthia Buchanan ([id number])  Transcribed:  on 03/25/2011  Approved: Cynthia Buchanan ([id number])  03/25/2011          Distribution:  Attending Doctor: Janis Vela  Alternate Doctor: Bernard Harris    Result Date: 6/18/2017  INDICATION: Abdominal pain for 2 days COMPARISON: 10/9/2014 TECHNIQUE: Following the uneventful intravenous administration of 100 cc Isovue-370, thin axial images were obtained through the abdomen and pelvis. Coronal and sagittal reconstructions were generated. Oral contrast was not administered. CT dose reduction was achieved through use of a standardized protocol tailored for this examination and automatic exposure control for dose modulation. FINDINGS: LUNG BASES: There is concern for bilateral lower lobe pulmonary emboli although the contrast is not optimal. INCIDENTALLY IMAGED HEART AND MEDIASTINUM: Unremarkable. LIVER: Hepatic steatosis is noted. GALLBLADDER: Unremarkable. SPLEEN: No mass. PANCREAS: No mass or ductal dilatation. ADRENALS: Unremarkable. KIDNEYS: There is a 3.1 cm right subcapsular hematoma. STOMACH: Unremarkable. SMALL BOWEL: No dilatation or wall thickening. COLON: No dilatation or wall thickening. APPENDIX: Unremarkable. PERITONEUM: No ascites or pneumoperitoneum. RETROPERITONEUM: No lymphadenopathy or aortic aneurysm. REPRODUCTIVE ORGANS: There is no pelvic mass or lymphadenopathy. URINARY BLADDER: No mass or calculus. BONES: No destructive bone lesion. ADDITIONAL COMMENTS: N/A     IMPRESSION: Right renal subcapsular hematoma. Suggestion of bilateral lower lobe pulmonary emboli although the bolus is not optimal for evaluation. Recommend further evaluation with chest CTA. Hepatic steatosis. The findings were called to Fernanda Oneill on 6/18/2017 at 3:25 PM by myself. [phone]              MEDICATIONS       ALL MEDICATIONS  Current Facility-Administered Medications   Medication Dose Route Frequency    sodium chloride (NS) flush 5-10 mL  5-10 mL IntraVENous PRN    metoprolol tartrate (LOPRESSOR) tablet 50 mg  50 mg Oral BID    ondansetron (ZOFRAN ODT) tablet 4 mg  4 mg Oral Q8H PRN    0.9% sodium chloride 1,000 mL with mvi, adult no. 4 with vit K 10 mL, thiamine 331 mg, folic acid 1 mg infusion   IntraVENous Q24H    diazePAM (VALIUM) injection 10 mg  10 mg IntraVENous Q1H PRN    diazePAM (VALIUM) injection 20 mg  20 mg IntraVENous Q1H PRN    prochlorperazine (COMPAZINE) injection 10 mg  10 mg IntraMUSCular Q6H PRN    hydrALAZINE (APRESOLINE) 20 mg/mL injection 20 mg  20 mg IntraVENous Q6H PRN    acetaminophen (TYLENOL) tablet 650 mg  650 mg Oral Q6H PRN    morphine injection 2 mg  2 mg IntraVENous Q4H PRN      SCHEDULED MEDICATIONS  Current Facility-Administered Medications   Medication Dose Route Frequency    metoprolol tartrate (LOPRESSOR) tablet 50 mg  50 mg Oral BID    0.9% sodium chloride 1,000 mL with mvi, adult no. 4 with vit K 10 mL, thiamine 499 mg, folic acid 1 mg infusion   IntraVENous Q24H                ASSESSMENT & PLAN        The patient Jose Jerome is a 42 y.o.  male who presents at this time for treatment of the following diagnoses:  Patient Active Hospital Problem List:   Hyponatremia (6/18/2017)    Assessment: as per medical team    Plan: as above Anxiety disorder ()    Assessment: not present    Plan: as above   Psychiatric disorder ()    Assessment: cluster A personality disorder which includes idiosyncratic thinking which can have Taoist or magical themes but no fixed and bizarre/delussional, usually being more to self,     Plan: refer for outpt treatment. Pt wishes to resume antidepressant and will follow to see if indicated   Suicidal ideation (10/5/2014)    Assessment: thoughts still present. No impilses, plans. Need to assess if stated for secondary gain i.e. Dependent and social stress leading to needing a place to stay    Plan: Continue 1:1 care for safety   Pulmonary embolism ([facility] (10/9/2014)    Assessment: per medical team    Plan: as above   Depression, major (7/27/2015)    Assessment: not present    Plan: as above   Alcohol use disorder, severe, dependence ([facility] (7/28/2015)    Assessment: detox as per medical team    Plan: as per medical team   Renal hematoma (6/18/2017)    Assessment: careful about addiction and tendency to overuse opiate analgesia    Plan: as per medical te       A coordinated, multidisplinary treatment team round was conducted with the patient; that includes the nurse, unit pharmcist,  and writer all present. The following regarding medications was addressed during rounds with patient:   the risks and benefits of the proposed medication. The patient was given the opportunity to ask questions. Informed consent given to the use of the above medications. I will continue to adjust psychiatric and non-psychiatric medications (see above \"medication\" section and orders section for details) as deemed appropriate & based upon diagnoses and response to treatment. I have reviewed admission (and previous/old) labs and medical tests in the EHR and or transferring hospital documents.  I will continue to order blood tests/labs and diagnostic tests as deemed appropriate and review results as they become available (see orders for details). I have reviewed old psychiatric and medical records available in the EHR. I Will order additional psychiatric records from other institutions to further elucidate the nature of patient's psychopathology and review once available. I will gather additional collateral information from friends, family and o/p treatment team to further elucidate the nature of patient's psychopathology and baselline level of psychiatric functioning.                      SIGNED:    Sabiha Gibbons MD  6/19/2017

## 2017-06-19 NOTE — PROGRESS NOTES
2330: Bedside and Verbal shift change report given to ZAK Murphy (oncoming nurse) by Eugenie Cole RN (offgoing nurse). Report included the following information SBAR, Kardex, Intake/Output, MAR and Recent Results. 0006: 2 mg IV Morphine given for pain. Will continue to monitor. 5026: Patient complaining of nausea. PRN Compazine given. Will continue to monitor. 0730: Bedside and Verbal shift change report given to ZAK Dixon (oncoming nurse) by Gee Love RN (offgoing nurse). Report included the following information SBAR, Kardex, Intake/Output, MAR and Recent Results.

## 2017-06-20 LAB
ALBUMIN SERPL BCP-MCNC: 2.9 G/DL (ref 3.5–5)
ALBUMIN/GLOB SERPL: 0.8 {RATIO} (ref 1.1–2.2)
ALP SERPL-CCNC: 124 U/L (ref 45–117)
ALT SERPL-CCNC: 26 U/L (ref 12–78)
ANION GAP BLD CALC-SCNC: 6 MMOL/L (ref 5–15)
AST SERPL W P-5'-P-CCNC: 22 U/L (ref 15–37)
BILIRUB SERPL-MCNC: 0.5 MG/DL (ref 0.2–1)
BUN SERPL-MCNC: 9 MG/DL (ref 6–20)
BUN/CREAT SERPL: 11 (ref 12–20)
CALCIUM SERPL-MCNC: 8.1 MG/DL (ref 8.5–10.1)
CHLORIDE SERPL-SCNC: 100 MMOL/L (ref 97–108)
CO2 SERPL-SCNC: 27 MMOL/L (ref 21–32)
CREAT SERPL-MCNC: 0.84 MG/DL (ref 0.7–1.3)
ERYTHROCYTE [DISTWIDTH] IN BLOOD BY AUTOMATED COUNT: 14.2 % (ref 11.5–14.5)
GLOBULIN SER CALC-MCNC: 3.7 G/DL (ref 2–4)
GLUCOSE SERPL-MCNC: 110 MG/DL (ref 65–100)
HCT VFR BLD AUTO: 39.2 % (ref 36.6–50.3)
HGB BLD-MCNC: 13.3 G/DL (ref 12.1–17)
MAGNESIUM SERPL-MCNC: 2.5 MG/DL (ref 1.6–2.4)
MCH RBC QN AUTO: 30.2 PG (ref 26–34)
MCHC RBC AUTO-ENTMCNC: 33.9 G/DL (ref 30–36.5)
MCV RBC AUTO: 89.1 FL (ref 80–99)
PHOSPHATE SERPL-MCNC: 3.5 MG/DL (ref 2.6–4.7)
PLATELET # BLD AUTO: 179 K/UL (ref 150–400)
POTASSIUM SERPL-SCNC: 3.9 MMOL/L (ref 3.5–5.1)
PROT SERPL-MCNC: 6.6 G/DL (ref 6.4–8.2)
RBC # BLD AUTO: 4.4 M/UL (ref 4.1–5.7)
SODIUM SERPL-SCNC: 133 MMOL/L (ref 136–145)
WBC # BLD AUTO: 8.8 K/UL (ref 4.1–11.1)

## 2017-06-20 PROCEDURE — 83735 ASSAY OF MAGNESIUM: CPT | Performed by: HOSPITALIST

## 2017-06-20 PROCEDURE — 36415 COLL VENOUS BLD VENIPUNCTURE: CPT | Performed by: HOSPITALIST

## 2017-06-20 PROCEDURE — 65660000000 HC RM CCU STEPDOWN

## 2017-06-20 PROCEDURE — 74011250636 HC RX REV CODE- 250/636: Performed by: INTERNAL MEDICINE

## 2017-06-20 PROCEDURE — 74011000250 HC RX REV CODE- 250: Performed by: HOSPITALIST

## 2017-06-20 PROCEDURE — 74011250636 HC RX REV CODE- 250/636: Performed by: HOSPITALIST

## 2017-06-20 PROCEDURE — 74011250637 HC RX REV CODE- 250/637: Performed by: HOSPITALIST

## 2017-06-20 PROCEDURE — 84100 ASSAY OF PHOSPHORUS: CPT | Performed by: HOSPITALIST

## 2017-06-20 PROCEDURE — 85027 COMPLETE CBC AUTOMATED: CPT | Performed by: HOSPITALIST

## 2017-06-20 PROCEDURE — 80053 COMPREHEN METABOLIC PANEL: CPT | Performed by: HOSPITALIST

## 2017-06-20 RX ORDER — AMOXICILLIN 250 MG
1 CAPSULE ORAL 2 TIMES DAILY
Status: DISCONTINUED | OUTPATIENT
Start: 2017-06-20 | End: 2017-06-22 | Stop reason: HOSPADM

## 2017-06-20 RX ORDER — OXYCODONE HYDROCHLORIDE 5 MG/1
5 TABLET ORAL
Status: DISCONTINUED | OUTPATIENT
Start: 2017-06-20 | End: 2017-06-22 | Stop reason: HOSPADM

## 2017-06-20 RX ORDER — ACETAMINOPHEN 325 MG/1
650 TABLET ORAL EVERY 8 HOURS
Status: DISCONTINUED | OUTPATIENT
Start: 2017-06-20 | End: 2017-06-22 | Stop reason: HOSPADM

## 2017-06-20 RX ORDER — POLYETHYLENE GLYCOL 3350 17 G/17G
17 POWDER, FOR SOLUTION ORAL DAILY
Status: DISCONTINUED | OUTPATIENT
Start: 2017-06-20 | End: 2017-06-22 | Stop reason: HOSPADM

## 2017-06-20 RX ADMIN — ACETAMINOPHEN 650 MG: 325 TABLET, FILM COATED ORAL at 09:08

## 2017-06-20 RX ADMIN — DOCUSATE SODIUM AND SENNOSIDES 1 TABLET: 8.6; 5 TABLET, FILM COATED ORAL at 18:45

## 2017-06-20 RX ADMIN — METOPROLOL TARTRATE 50 MG: 50 TABLET ORAL at 18:09

## 2017-06-20 RX ADMIN — VENLAFAXINE 150 MG: 37.5 TABLET ORAL at 09:08

## 2017-06-20 RX ADMIN — Medication 2 MG: at 02:19

## 2017-06-20 RX ADMIN — METOPROLOL TARTRATE 50 MG: 50 TABLET ORAL at 09:08

## 2017-06-20 RX ADMIN — FOLIC ACID: 5 INJECTION, SOLUTION INTRAMUSCULAR; INTRAVENOUS; SUBCUTANEOUS at 18:13

## 2017-06-20 RX ADMIN — DIAZEPAM 10 MG: 5 INJECTION, SOLUTION INTRAMUSCULAR; INTRAVENOUS at 03:16

## 2017-06-20 RX ADMIN — POLYETHYLENE GLYCOL 3350 17 G: 17 POWDER, FOR SOLUTION ORAL at 18:45

## 2017-06-20 RX ADMIN — VENLAFAXINE 75 MG: 37.5 TABLET ORAL at 22:14

## 2017-06-20 RX ADMIN — ACETAMINOPHEN 650 MG: 325 TABLET, FILM COATED ORAL at 18:09

## 2017-06-20 NOTE — PROGRESS NOTES
1930: Bedside shift change report given to [address] (oncoming nurse) by Hubert RN (offgoing nurse). Report included the following information SBAR.     2307: VSS, pt resting quietly in bed. Sitter still at bedside completing suicide watch. Pt declines pain medication at this time. Will continue to monitor. 0219Everett Rolling to check on pt, complained he was in pain. Offered morphine, pt accepted. After administering medication, pt asked multiple times if he was going to get addicted to the pain medication. He stated he would refuse from now on because he was afraid of becoming addicted. [id number] Kapoor called out stating the BP was elevated. Pt was much more anxious than when previously in room. Still talking about not wanting to be addicted to pain medication. CIWA of 11. Gave 10mg Valium. 0476: CIWA down to 6, pt is resting in bed. Mild jerking of arms noted periodically. 0730: Bedside shift change report given to [address] (oncoming nurse) by [address] (offgoing nurse). Report included the following information SBAR.

## 2017-06-20 NOTE — PROGRESS NOTES
Urology Progress Note    Patient: Romana Charles MRN: [id number]  SSN: [id number]    YOB: 1975  Age: 42 y.o. Sex: male        ADMITTED: 2017 to Whitley Pena MD for Hyponatremia  POD# * No surgery found *     Diagnosis: R renal subcapsular hematoma    Still c/o R flank pain. Trying not to take narcotics. Remains HD stable. BP still a little high but improved. Urine clear. Vitals: Temp (24hrs), Av.6 °F (37 °C), Min:98.1 °F (36.7 °C), Max:99.1 °F (37.3 °C)                   Blood pressure 150/86, pulse 84, temperature 98.7 °F (37.1 °C), resp. rate 18, height 6' 3\" (1.905 m), weight 107 kg (235 lb 14.3 oz), SpO2 97 %. Intake and Output:   1901 -  0700  In: 4194.6 [P.O.:820; I.V.:3374.6]  Out: 7875 [Urine:7875]    Flank/abdomen - less ecchymosis        Labs:  Labs:   Lab Results   Component Value Date/Time    WBC 8.8 2017 02:25 AM    HGB 13.3 2017 02:25 AM    Creatinine 0.84 2017 02:25 AM         Assessment/Plan:   R renal trauma/subcapsular hematoma. BP, Hgb stable. Repeat CT in a few days - can combine with CTA if needed.          Signed By: Minal Freedman MD - 2017

## 2017-06-20 NOTE — PROGRESS NOTES
19:30 Bedside shift change report given to Fadi Oneill Judejb Austin (oncoming nurse) by Mira Negrete RN (offgoing nurse). Report included the following information SBAR.

## 2017-06-20 NOTE — PROGRESS NOTES
Pt continues to describe himself as quite depressed. States still with sense of hopeless/helplessness and significant suicidal thoughts  States he does want to stop alcohol use and is not sure if he is able. Sees this as the main stumbling block with family in that abstinence complete and forever is what he sees as their condition to starting relationships/  Mental status shows him to be alert and oriented  Sad primary emotion, mood depressed. Goal directed speech which is with normal rhythm, volume, rate. No psychotic sx and cognitive function unimpaired. Pt less hisrtionic in manner. Dx: Alcohol abuse, getting over withdrawal rapidly which indicates not as much alcohol use as previously thought. Suicidal sx may be in response to social/$ problems and not as strong as pt reports.   However not stable and will recommend transfer to Psychiatry when medically stable

## 2017-06-20 NOTE — PROGRESS NOTES
Hospitalist Progress Note  Mirta Sinclair MD  Office: [phone]  Cell: [phone]      Date of Service:  2017  NAME:  Sheela Infante  :  1975  MRN:  [id number]      Admission Summary:   43 yo male with PMHx of Alcohol abuse, HTN, PE, DDD, admitted with intoxication and Rt flank pain. Interval history / Subjective:     Pt seen and examined  Sitting up and eating breakfast  Continue 1:1 per psych     Assessment & Plan:     Alcohol abuse with withdrawal: (POA) exhibiting withdrawal symptoms despite drinking 4 hours ago. Last drink 12 PM  high risk DTs,  -loaded with diazepam, monitor with CIWA  -provide goodie bag  -aggressive electrolyte repletion as needed  -counseled him on alcohol cessation, would reinforce this when his mood is improved     Hyponatremia: (POA) this is likely due to poor solute intake/beer potomania  -check urine sodium and osms  -was bolused in the ED with NS  -d/c fluids     Right renal hematoma: (POA), traumatic  -consult urology, repeat CT in 2-3 days     Bilateral pulmonary emboli, Chronic: (POA) these were present in , no chest pain or hypoxia currently. Tachycardia is likely due to the other problems.   -hold off on CTA of the chest since he just got a contrast load; can consider getting it in 24-48 hours  -no anticoagulation for now given renal hematoma  - discussed IVC filter, not interested     Depression and anxiety w/ suicidal ideation:  -place with 1:1 sitter  -Psych eval noted, possible plan for inpatient psych once medically stable.     HTN: likely confounded by alcohol withdrawl  -resume home metoprolol     Frequent falls: due to alcohol abuse  -fall precautions for now  -will re-evaluate with PT once he is more stable    Code status: FULL  DVT prophylaxis:SCDs    Care Plan discussed with: Patient/Family and Nurse  Disposition: Home w/Family     Hospital Problems  Date Reviewed: 10/9/2014 Codes Class Noted POA    * (Principal)Hyponatremia ICD-10-CM: E87.1  ICD-9-CM: 276.1  6/18/2017 Unknown        Renal hematoma ICD-10-CM: S37.019A  ICD-9-CM: 866.01  6/18/2017 Unknown        Alcohol use disorder, severe, dependence ([facility] [address] ICD-10-CM: F10.20  ICD-9-CM: 303.90  7/28/2015 Yes        Depression, major ICD-10-CM: F32.9  ICD-9-CM: 296.20  7/27/2015 Yes        Pulmonary embolism (HCC) ICD-10-CM: I26.99  ICD-9-CM: 415.19  10/9/2014 Yes        Suicidal ideation ICD-10-CM: R45.851  ICD-9-CM: V62.84  10/5/2014 Yes        Anxiety disorder ICD-10-CM: F41.9  ICD-9-CM: 300.00  Unknown Yes        Psychiatric disorder ICD-10-CM: F99  ICD-9-CM: 300.9  Unknown Yes    Overview Signed 11/21/2011 11:35 AM by Stanislaw Arzola LPN     depression                     Review of Systems:   A comprehensive review of systems was negative except for that written in the HPI. Vital Signs:    Last 24hrs VS reviewed since prior progress note. Most recent are:  Visit Vitals    /86 (BP 1 Location: Right arm, BP Patient Position: At rest)    Pulse 72    Temp 98.7 °F (37.1 °C)    Resp 18    Ht 6' 3\" (1.905 m)    Wt 107 kg (235 lb 14.3 oz)    SpO2 97%    BMI 29.48 kg/m2         Intake/Output Summary (Last 24 hours) at 06/20/17 5029  Last data filed at 06/20/17 0836   Gross per 24 hour   Intake             2345 ml   Output             4750 ml   Net            -2405 ml        Physical Examination:             Constitutional:  No acute distress, cooperative, pleasant , mildly tremulous   ENT:  Oral mucous moist, oropharynx benign. Neck supple,    Resp:  CTA bilaterally. CV:  Regular rhythm, normal rate, no murmurs, gallops, rubs    GI:  Soft, non distended. +Tenderness,  normoactive bowel sounds    Musculoskeletal:  No edema, warm, 2+ pulses throughout    Neurologic:  Moves all extremities.   AAOx3     Skin:  ecchymosisi b/l elbows       Data Review:    Review and/or order of clinical lab test  Review and/or order of tests in the radiology section of CPT  Review and/or order of tests in the medicine section of CPT      Labs:     Recent Labs      06/20/17 0225 06/19/17   0512   WBC  8.8  9.3   HGB  13.3  13.5   HCT  39.2  39.4   PLT  179  183     Recent Labs      06/20/17 0225 06/19/17   0512  06/18/17   1826  06/18/17   1325   NA  133*  134*  132*  125*   K  3.9  3.8   --   4.0   CL  100  101   --   90*   CO2  27  26   --   20*   BUN  9  8   --   7   CREA  0.84  0.95   --   0.79   GLU  110*  99   --   89   CA  8.1*  8.3*   --   8.4*   MG  2.5*  2.7*   --   2.0   PHOS  3.5  3.5   --   3.0     Recent Labs      06/20/17 0225 06/18/17   1325   SGOT  22  40*   ALT  26  31   AP  124*  135*   TBILI  0.5  0.7   TP  6.6  7.0   ALB  2.9*  3.4*   GLOB  3.7  3.6   LPSE   --   114     No results for input(s): INR, PTP, APTT in the last 72 hours. No lab exists for component: INREXT, INREXT   No results for input(s): FE, TIBC, PSAT, FERR in the last 72 hours. No results found for: FOL, RBCF   No results for input(s): PH, PCO2, PO2 in the last 72 hours. No results for input(s): CPK, CKNDX, TROIQ in the last 72 hours.     No lab exists for component: CPKMB  Lab Results   Component Value Date/Time    Cholesterol, total 155 07/29/2015 05:23 AM    HDL Cholesterol 63 07/29/2015 05:23 AM    LDL, calculated 71.6 07/29/2015 05:23 AM    Triglyceride 102 07/29/2015 05:23 AM    CHOL/HDL Ratio 2.5 07/29/2015 05:23 AM     Lab Results   Component Value Date/Time    Glucose (POC) 102 07/27/2015 01:22 PM    Glucose (POC) 214 03/25/2015 01:30 PM    Glucose (POC) 87 09/23/2011 05:25 AM    Glucose (POC) 78 09/23/2011 04:09 AM    Glucose (POC) 115 09/23/2011 01:10 AM    Glucose (POC) 56 09/23/2011 12:33 AM     Lab Results   Component Value Date/Time    Color YELLOW/STRAW 06/18/2017 01:25 PM    Appearance CLEAR 06/18/2017 01:25 PM    Specific gravity 1.006 06/18/2017 01:25 PM    pH (UA) 6.0 06/18/2017 01:25 PM    Protein NEGATIVE  06/18/2017 01:25 PM    Glucose NEGATIVE  06/18/2017 01:25 PM    Ketone 15 06/18/2017 01:25 PM    Bilirubin NEGATIVE  06/18/2017 01:25 PM    Urobilinogen 0.2 06/18/2017 01:25 PM    Nitrites NEGATIVE  06/18/2017 01:25 PM    Leukocyte Esterase NEGATIVE  06/18/2017 01:25 PM    Epithelial cells FEW 06/18/2017 01:25 PM    Bacteria NEGATIVE  06/18/2017 01:25 PM    WBC 0-4 06/18/2017 01:25 PM    RBC 0-5 06/18/2017 01:25 PM         Medications Reviewed:     Current Facility-Administered Medications   Medication Dose Route Frequency    sodium chloride (NS) flush 5-10 mL  5-10 mL IntraVENous PRN    venlafaxine (EFFEXOR) tablet 75 mg  75 mg Oral QHS    venlafaxine (EFFEXOR) tablet 150 mg  150 mg Oral DAILY WITH BREAKFAST    metoprolol tartrate (LOPRESSOR) tablet 50 mg  50 mg Oral BID    ondansetron (ZOFRAN ODT) tablet 4 mg  4 mg Oral Q8H PRN    0.9% sodium chloride 1,000 mL with mvi, adult no. 4 with vit K 10 mL, thiamine 363 mg, folic acid 1 mg infusion   IntraVENous Q24H    diazePAM (VALIUM) injection 10 mg  10 mg IntraVENous Q1H PRN    diazePAM (VALIUM) injection 20 mg  20 mg IntraVENous Q1H PRN    prochlorperazine (COMPAZINE) injection 10 mg  10 mg IntraMUSCular Q6H PRN    hydrALAZINE (APRESOLINE) 20 mg/mL injection 20 mg  20 mg IntraVENous Q6H PRN    acetaminophen (TYLENOL) tablet 650 mg  650 mg Oral Q6H PRN    morphine injection 2 mg  2 mg IntraVENous Q4H PRN     ______________________________________________________________________  EXPECTED LENGTH OF STAY: 3d 9h  ACTUAL LENGTH OF STAY:          2                 Ulises Hart MD

## 2017-06-20 NOTE — PROGRESS NOTES
CM reviewed chart- patient admitted for suicidal ideations and also medical management of hyponatremia -renal hematoma - just released from long term 10 days ago and also had left [facility] as well - Dr. Tarik Mina is listed as his PCP - patient remains on suicidal precautions at this time - is being followed by psychiatry as well - per team may require psychiatric inpatient admission once medically cleared.   ROSANA Navarrete

## 2017-06-21 ENCOUNTER — APPOINTMENT (OUTPATIENT)
Dept: CT IMAGING | Age: 42
DRG: 897 | End: 2017-06-21
Attending: UROLOGY
Payer: MEDICARE

## 2017-06-21 PROCEDURE — 74011000250 HC RX REV CODE- 250: Performed by: HOSPITALIST

## 2017-06-21 PROCEDURE — 74011250636 HC RX REV CODE- 250/636: Performed by: HOSPITALIST

## 2017-06-21 PROCEDURE — 97161 PT EVAL LOW COMPLEX 20 MIN: CPT

## 2017-06-21 PROCEDURE — 74150 CT ABDOMEN W/O CONTRAST: CPT

## 2017-06-21 PROCEDURE — 65660000000 HC RM CCU STEPDOWN

## 2017-06-21 PROCEDURE — 74011250637 HC RX REV CODE- 250/637: Performed by: HOSPITALIST

## 2017-06-21 PROCEDURE — 97165 OT EVAL LOW COMPLEX 30 MIN: CPT

## 2017-06-21 PROCEDURE — G8978 MOBILITY CURRENT STATUS: HCPCS

## 2017-06-21 PROCEDURE — G8979 MOBILITY GOAL STATUS: HCPCS

## 2017-06-21 PROCEDURE — 97116 GAIT TRAINING THERAPY: CPT

## 2017-06-21 PROCEDURE — G8980 MOBILITY D/C STATUS: HCPCS

## 2017-06-21 RX ORDER — HYDRALAZINE HYDROCHLORIDE 20 MG/ML
20 INJECTION INTRAMUSCULAR; INTRAVENOUS
Status: DISCONTINUED | OUTPATIENT
Start: 2017-06-21 | End: 2017-06-22 | Stop reason: HOSPADM

## 2017-06-21 RX ORDER — DIAZEPAM 2 MG/1
2 TABLET ORAL 3 TIMES DAILY
Status: DISCONTINUED | OUTPATIENT
Start: 2017-06-21 | End: 2017-06-21

## 2017-06-21 RX ORDER — LISINOPRIL 5 MG/1
5 TABLET ORAL DAILY
Status: DISCONTINUED | OUTPATIENT
Start: 2017-06-21 | End: 2017-06-22 | Stop reason: HOSPADM

## 2017-06-21 RX ORDER — DIAZEPAM 2 MG/1
2 TABLET ORAL
Status: DISCONTINUED | OUTPATIENT
Start: 2017-06-21 | End: 2017-06-22 | Stop reason: HOSPADM

## 2017-06-21 RX ADMIN — LISINOPRIL 5 MG: 5 TABLET ORAL at 11:46

## 2017-06-21 RX ADMIN — DIAZEPAM 2 MG: 2 TABLET ORAL at 11:47

## 2017-06-21 RX ADMIN — HYDRALAZINE HYDROCHLORIDE 20 MG: 20 INJECTION INTRAMUSCULAR; INTRAVENOUS at 08:43

## 2017-06-21 RX ADMIN — ACETAMINOPHEN 650 MG: 325 TABLET, FILM COATED ORAL at 17:03

## 2017-06-21 RX ADMIN — Medication 10 ML: at 21:51

## 2017-06-21 RX ADMIN — FOLIC ACID: 5 INJECTION, SOLUTION INTRAMUSCULAR; INTRAVENOUS; SUBCUTANEOUS at 21:56

## 2017-06-21 RX ADMIN — OXYCODONE HYDROCHLORIDE 5 MG: 5 TABLET ORAL at 15:22

## 2017-06-21 RX ADMIN — METOPROLOL TARTRATE 50 MG: 50 TABLET ORAL at 08:05

## 2017-06-21 RX ADMIN — ACETAMINOPHEN 650 MG: 325 TABLET, FILM COATED ORAL at 22:53

## 2017-06-21 RX ADMIN — DOCUSATE SODIUM AND SENNOSIDES 1 TABLET: 8.6; 5 TABLET, FILM COATED ORAL at 17:04

## 2017-06-21 RX ADMIN — HYDRALAZINE HYDROCHLORIDE 20 MG: 20 INJECTION INTRAMUSCULAR; INTRAVENOUS at 19:49

## 2017-06-21 RX ADMIN — ACETAMINOPHEN 650 MG: 325 TABLET, FILM COATED ORAL at 08:05

## 2017-06-21 RX ADMIN — ACETAMINOPHEN 650 MG: 325 TABLET, FILM COATED ORAL at 11:09

## 2017-06-21 RX ADMIN — VENLAFAXINE 75 MG: 37.5 TABLET ORAL at 21:51

## 2017-06-21 RX ADMIN — VENLAFAXINE 150 MG: 37.5 TABLET ORAL at 08:04

## 2017-06-21 RX ADMIN — METOPROLOL TARTRATE 50 MG: 50 TABLET ORAL at 17:04

## 2017-06-21 RX ADMIN — ACETAMINOPHEN 650 MG: 325 TABLET, FILM COATED ORAL at 00:31

## 2017-06-21 RX ADMIN — DOCUSATE SODIUM AND SENNOSIDES 1 TABLET: 8.6; 5 TABLET, FILM COATED ORAL at 08:06

## 2017-06-21 NOTE — PROGRESS NOTES
Hospitalist Progress Note  Torres David MD  Office: [phone]  Cell: [phone]      Date of Service:  2017  NAME:  Baron Miranda  :  1975  MRN:  [id number]      Admission Summary:   41 yo male with PMHx of Alcohol abuse, HTN, PE, DDD, admitted with intoxication and Rt flank pain. Interval history / Subjective:     Pt seen and examined  Sitting up and eating breakfast  Continue 1:1 per psych  Repeat CT abd/pel per urology today     Assessment & Plan:     Alcohol abuse with withdrawal: (POA) exhibiting withdrawal symptoms despite drinking 4 hours ago. Last drink 12 PM  high risk DTs,  -loaded with diazepam, monitor with CIWA  -provide goodie bag  -aggressive electrolyte repletion as needed  -counseled him on alcohol cessation, would reinforce this when his mood is improved     Hyponatremia: (POA) this is likely due to poor solute intake/beer potomania  -check urine sodium and osms  -was bolused in the ED with NS  -d/c fluids     Right renal hematoma: (POA), traumatic  -consult urology  - repeat CT without significant change noted, d/w , he is cleared for discharge. Pt needs a repeat CT in 6 weeks       Bilateral pulmonary emboli, Chronic: (POA) these were present in , no chest pain or hypoxia currently. Tachycardia is likely due to the other problems.   -hold off on CTA of the chest since he just got a contrast load; can consider getting it in 24-48 hours  -no anticoagulation for now given renal hematoma  - discussed IVC filter, not interested     Depression and anxiety w/ suicidal ideation:  -place with 1:1 sitter  -Psych eval noted, possible plan for inpatient psych once medically stable.     HTN: likely confounded by alcohol withdrawl  -resume home metoprolol, added lisinopril     Frequent falls: due to alcohol abuse  -fall precautions for now  -will re-evaluate with PT once he is more stable    Code status: FULL  DVT prophylaxis:SCDs    Care Plan discussed with: Patient/Family and Nurse  Disposition: Home w/Family, if okay with Urology , can go to psych in [place] [address] Problems  Date Reviewed: 10/9/2014          Codes Class Noted POA    * (Principal)Hyponatremia ICD-10-CM: E87.1  ICD-9-CM: 276.1  6/18/2017 Unknown        Renal hematoma ICD-10-CM: S37.019A  ICD-9-CM: 866.01  6/18/2017 Unknown        Alcohol use disorder, severe, dependence ([facility] ICD-10-CM: F10.20  ICD-9-CM: 303.90  7/28/2015 Yes        Depression, major ICD-10-CM: F32.9  ICD-9-CM: 296.20  7/27/2015 Yes        Pulmonary embolism ([facility] ICD-10-CM: I26.99  ICD-9-CM: 415.19  10/9/2014 Yes        Suicidal ideation ICD-10-CM: R45.851  ICD-9-CM: V62.84  10/5/2014 Yes        Anxiety disorder ICD-10-CM: F41.9  ICD-9-CM: 300.00  Unknown Yes        Psychiatric disorder ICD-10-CM: F99  ICD-9-CM: 300.9  Unknown Yes    Overview Signed 11/21/2011 11:35 AM by Vibha Haq LPN     depression                     Review of Systems:   A comprehensive review of systems was negative except for that written in the HPI. Vital Signs:    Last 24hrs VS reviewed since prior progress note. Most recent are:  Visit Vitals    BP (!) 140/91 (BP 1 Location: Right arm, BP Patient Position: At rest;Sitting)    Pulse 77    Temp 98.2 °F (36.8 °C)    Resp 18    Ht 6' 3\" (1.905 m)    Wt 109.7 kg (241 lb 13.5 oz)    SpO2 96%    BMI 30.23 kg/m2         Intake/Output Summary (Last 24 hours) at 06/21/17 1202  Last data filed at 06/21/17 1101   Gross per 24 hour   Intake             2380 ml   Output             5050 ml   Net            -2670 ml        Physical Examination:             Constitutional:  No acute distress, cooperative, pleasant , minimal tremors   ENT:  Oral mucous moist,   Resp:  CTA bilaterally. CV:  Regular rhythm, normal rate    GI:  Soft, non distended.  +Tenderness,  normoactive bowel sounds    Musculoskeletal:  No edema, warm, 2+ pulses throughout Neurologic:  Moves all extremities. AAOx3     Psych:  anxious        Data Review:    Review and/or order of clinical lab test  Review and/or order of tests in the radiology section of CPT  Review and/or order of tests in the medicine section of CPT      Labs:     Recent Labs      06/20/17 0225 06/19/17   0512   WBC  8.8  9.3   HGB  13.3  13.5   HCT  39.2  39.4   PLT  179  183     Recent Labs      06/20/17 0225 06/19/17   0512  06/18/17   1826  06/18/17   1325   NA  133*  134*  132*  125*   K  3.9  3.8   --   4.0   CL  100  101   --   90*   CO2  27  26   --   20*   BUN  9  8   --   7   CREA  0.84  0.95   --   0.79   GLU  110*  99   --   89   CA  8.1*  8.3*   --   8.4*   MG  2.5*  2.7*   --   2.0   PHOS  3.5  3.5   --   3.0     Recent Labs      06/20/17 0225 06/18/17   1325   SGOT  22  40*   ALT  26  31   AP  124*  135*   TBILI  0.5  0.7   TP  6.6  7.0   ALB  2.9*  3.4*   GLOB  3.7  3.6   LPSE   --   114     No results for input(s): INR, PTP, APTT in the last 72 hours. No lab exists for component: INREXT, INREXT   No results for input(s): FE, TIBC, PSAT, FERR in the last 72 hours. No results found for: FOL, RBCF   No results for input(s): PH, PCO2, PO2 in the last 72 hours. No results for input(s): CPK, CKNDX, TROIQ in the last 72 hours.     No lab exists for component: CPKMB  Lab Results   Component Value Date/Time    Cholesterol, total 155 07/29/2015 05:23 AM    HDL Cholesterol 63 07/29/2015 05:23 AM    LDL, calculated 71.6 07/29/2015 05:23 AM    Triglyceride 102 07/29/2015 05:23 AM    CHOL/HDL Ratio 2.5 07/29/2015 05:23 AM     Lab Results   Component Value Date/Time    Glucose (POC) 102 07/27/2015 01:22 PM    Glucose (POC) 214 03/25/2015 01:30 PM    Glucose (POC) 87 09/23/2011 05:25 AM    Glucose (POC) 78 09/23/2011 04:09 AM    Glucose (POC) 115 09/23/2011 01:10 AM    Glucose (POC) 56 09/23/2011 12:33 AM     Lab Results   Component Value Date/Time    Color YELLOW/STRAW 06/18/2017 01:25 PM Appearance CLEAR 06/18/2017 01:25 PM    Specific gravity 1.006 06/18/2017 01:25 PM    pH (UA) 6.0 06/18/2017 01:25 PM    Protein NEGATIVE  06/18/2017 01:25 PM    Glucose NEGATIVE  06/18/2017 01:25 PM    Ketone 15 06/18/2017 01:25 PM    Bilirubin NEGATIVE  06/18/2017 01:25 PM    Urobilinogen 0.2 06/18/2017 01:25 PM    Nitrites NEGATIVE  06/18/2017 01:25 PM    Leukocyte Esterase NEGATIVE  06/18/2017 01:25 PM    Epithelial cells FEW 06/18/2017 01:25 PM    Bacteria NEGATIVE  06/18/2017 01:25 PM    WBC 0-4 06/18/2017 01:25 PM    RBC 0-5 06/18/2017 01:25 PM         Medications Reviewed:     Current Facility-Administered Medications   Medication Dose Route Frequency    diazePAM (VALIUM) tablet 2 mg  2 mg Oral Q12H PRN    lisinopril (PRINIVIL, ZESTRIL) tablet 5 mg  5 mg Oral DAILY    acetaminophen (TYLENOL) tablet 650 mg  650 mg Oral Q8H    oxyCODONE IR (ROXICODONE) tablet 5 mg  5 mg Oral Q6H PRN    polyethylene glycol (MIRALAX) packet 17 g  17 g Oral DAILY    senna-docusate (PERICOLACE) 8.6-50 mg per tablet 1 Tab  1 Tab Oral BID    sodium chloride (NS) flush 5-10 mL  5-10 mL IntraVENous PRN    venlafaxine (EFFEXOR) tablet 75 mg  75 mg Oral QHS    venlafaxine (EFFEXOR) tablet 150 mg  150 mg Oral DAILY WITH BREAKFAST    metoprolol tartrate (LOPRESSOR) tablet 50 mg  50 mg Oral BID    ondansetron (ZOFRAN ODT) tablet 4 mg  4 mg Oral Q8H PRN    0.9% sodium chloride 1,000 mL with mvi, adult no. 4 with vit K 10 mL, thiamine 619 mg, folic acid 1 mg infusion   IntraVENous Q24H    diazePAM (VALIUM) injection 10 mg  10 mg IntraVENous Q1H PRN    diazePAM (VALIUM) injection 20 mg  20 mg IntraVENous Q1H PRN    prochlorperazine (COMPAZINE) injection 10 mg  10 mg IntraMUSCular Q6H PRN    hydrALAZINE (APRESOLINE) 20 mg/mL injection 20 mg  20 mg IntraVENous Q6H PRN    acetaminophen (TYLENOL) tablet 650 mg  650 mg Oral Q6H PRN     ______________________________________________________________________  EXPECTED LENGTH OF STAY: 3d 9h  ACTUAL LENGTH OF STAY:          [address] Perales MD

## 2017-06-21 NOTE — PROGRESS NOTES
5199: Bedside shift change report given to [address] Jerardo, RN (oncoming nurse) by Gurwinder Watkins RN (offgoing nurse). Report included the following information SBAR, Kardex, ED Summary, Intake/Output, MAR, Accordion, Recent Results, Med Rec Status and Cardiac Rhythm NSR.   0754: BP was high, gave scheduled metoprolol. 0101: Rechecked BP, still high (177/114) gave PRN hydralazine. 0900: /90; patient taken down to radiology for CT.   0932: Patient returned to unit. Tele-monitor placed and confirmed with monitor tech. 1000: Patient showered. 1109: Spoke with patient about eating. He said that he wants to \"fast\" for \"spiritual reasons. \" RN explained the importance of eating while in the hospital to promote healing.   1227: Spoke with Dr. Marilee Zheng, urology has cleared patient. If BP is stable tomorrow patient is ok to go psychiatric unit. Problem: Suicide/Homicide (Adult/Pediatric)  Goal: *STG: Remains safe in hospital  Outcome: Progressing Towards Goal  1:1 for safety. Problem: Falls - Risk of  Goal: *Absence of falls  Outcome: Progressing Towards Goal  Pt's bed is locked, in low position, side rails x2, gripper socks on, and call bell within reach. Pt knows to call for assitance. Problem: Pressure Injury - Risk of  Goal: *Prevention of pressure ulcer  Outcome: Progressing Towards Goal    06/21/17 0754   Wound Prevention and Protection Methods   Orientation of Wound Prevention Posterior   Location of Wound Prevention Sacrum/Coccyx   Dressing Present  No   Read Only, Retired: Wound Treatment (non-mechanical)   Wound Offloading (Prevention Methods) Bed, pressure reduction mattress;Pillows;Repositioning;Turning         Problem: Alcohol Withdrawal  Goal: *STG: Complies with medication therapy  Outcome: Progressing Towards Goal  Pt is taking medication as prescribed.

## 2017-06-21 NOTE — PROGRESS NOTES
physical Therapy EVALUATION/DISCHARGE  Patient: Jarod Miller (46 y.o. male)  Date: 6/21/2017  Primary Diagnosis: Hyponatremia  Precautions:  Fall, Seizure (Suicide precautions;1:1 sitter)  ASSESSMENT :  Based on the objective data described below, the patient presents with near-baseline functional mobility though this PT did note minor tremor in right hand (increased with exertion), dysmetria with finger-tip to nose coordination assessment (R > L), and inability to maintain single limb stance for > 3 seconds, bilaterally. Pt reporting 7/10 right flank pain (renal hematoma). Pt now denying visual/auditory hallucinations, nausea, dizziness, blurred vision, and headache. Overall, pt completed bed mobility, functional transfers, and ambulation with supervision/independence. Pt ambulated x 400 ft without an AD, with minimal path deviations (no overt LOB), and with appropriate arnoldo. Pt scored a 27/28 on the Tinetti. 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 Pt also reports frequent falls due to excessive alcohol consumption. Pt has attended [place] [address] meetings in the past, but reports no benefit. From a functional mobility standpoint, pt has no further skilled acute PT needs. Recommend a rehab facility for alcohol dependency. Skilled physical therapy is not indicated at this time. PLAN :  Discharge Recommendations: None  Further Equipment Recommendations for Discharge: None     SUBJECTIVE:   Patient stated \"Alcohol has been a struggle for me. 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 No pertinent surgical history. Prior Level of Function/Home Situation: See assessment above. Personal factors and/or comorbidities impacting plan of care: Alcoholism, suicidal tendencies, anxiety     Home Situation  Home Environment: Private residence (One story per pt)  # Steps to Enter: 0  Rails to [employer]: No  One/Two Story Residence: One story  # of Interior Steps:  (na)  Height of Each Step (in):  (na)  Interior Rails: None  Lift Chair Available: No  Living Alone: Yes  Support Systems: None  Patient Expects to be Discharged to[de-identified] Unknown  Current DME Used/Available at Home: None    EXAMINATION/PRESENTATION/DECISION MAKING:   Critical Behavior:  Neurologic State: Alert  Orientation Level: Oriented X4  Cognition: Follows commands  Safety/Judgement: Awareness of environment, Fall prevention  Hearing: Auditory  Auditory Impairment: None  Range Of Motion:  AROM: Within functional limits  PROM: Within functional limits  Strength:    Strength:  Within functional limits  Tone & Sensation:   Tone: Normal  Sensation: Intact  Coordination:  Coordination: Within functional limits  Vision:   Tracking:  (Appears intact)  Visual Fields:  (Appears intact)  Acuity: Within Defined Limits  Functional Mobility:  Bed Mobility:  Supine to Sit: Independent  Transfers:  Sit to Stand: Independent  Stand to Sit: Independent  Bed to Chair: Independent  Balance:   Sitting: Intact  Standing: Intact  Ambulation/Gait Training:  Distance (ft): 400 Feet (ft)  Assistive Device: Gait belt  Ambulation - Level of Assistance: Supervision  Gait Abnormalities:  (Slight path deviations but no overt LOB)  Base of Support: Narrowed    Functional Measure:  Tinetti test:    Sitting Balance: 1  Arises: 2  Attempts to Rise: 2  Immediate Standing Balance: 2  Standing Balance: 2  Nudged: 2  Eyes Closed: 1  Turn 360 Degrees - Continuous/Discontinuous: 1  Turn 360 Degrees - Steady/Unsteady: 1  Sitting Down: 2  Balance Score: 16  Indication of Gait: 1  R Step Length/Height: 1  L Step Length/Height: 1  R Foot Clearance: 1  L Foot Clearance: 1  Step Symmetry: 1  Step Continuity: 1  Path: 1  Trunk: 2  Walking Time: 1  Gait Score: 11  Total Score: 27       Tinetti Test and G-code impairment scale:  Percentage of Impairment CH    0%   CI    1-19% CJ    20-39% CK    40-59% CL    60-79% CM    80-99% CN     100%   Tinetti  Score 0-28 28 23-27 17-22 12-16 6-11 1-5 0       Tinetti Tool Score Risk of Falls  <19 = High Fall Risk  19-24 = Moderate Fall Risk  25-28 = Low Fall Risk  Tinetti ME. Performance-Oriented Assessment of Mobility Problems in Elderly Patients. Weber 66; [id number]. (Scoring Description: PT Bulletin Feb. 10, 1993)    Older adults: Alicia El et al, 2009; n = 1000 [facility] elderly evaluated with ABC, AUGUSTA, ADL, and IADL)  · Mean AUGUSTA score for males aged 69-68 years = 26.21(3.40)  · Mean AUGUSTA score for females age 69-68 years = 25.16(4.30)  · Mean AUGUSTA score for males over 80 years = 23.29(6.02)  · Mean AUGUSTA score for females over 80 years = 17.20(8.32)       G codes: In compliance with CMSs Claims Based Outcome Reporting, the following G-code set was chosen for this patient based on their primary functional limitation being treated: The outcome measure chosen to determine the severity of the functional limitation was the Tinetti with a score of 27/28 which was correlated with the impairment scale.     ? Mobility - Walking and Moving Around:     - CURRENT STATUS: CI - 1%-19% impaired, limited or restricted    - GOAL STATUS: CI - 1%-19% impaired, limited or restricted    - D/C STATUS:  CI - 1%-19% impaired, limited or restricted          Physical Therapy Evaluation Charge Determination   History Examination Presentation Decision-Making   HIGH Complexity :3+ comorbidities / personal factors will impact the outcome/ POC  LOW Complexity : 1-2 Standardized tests and measures addressing body structure, function, activity limitation and / or participation in recreation  LOW Complexity : Stable, uncomplicated  Other outcome measures Tinetti  LOW       Based on the above components, the patient evaluation is determined to be of the following complexity level: LOW     Pain:Pain Scale 1: Numeric (0 - 10)  Pain Intensity 1: 7  Pain Location 1: Abdomen;Back     Activity Tolerance:   Please refer to the flowsheet for vital signs taken during this treatment. After treatment:   [x]   Patient left in no apparent distress sitting up on EOB  []   Patient left in no apparent distress in bed  [x]   Call bell left within reach  [x]   Nursing notified  []   Caregiver present  []   Bed alarm activated    COMMUNICATION/EDUCATION:   Communication/Collaboration:  [x]   Fall prevention education was provided and the patient/caregiver indicated understanding. [x]   Patient/family have participated as able and agree with findings and recommendations. []   Patient is unable to participate in plan of care at this time.   Findings and recommendations were discussed with: Occupational Therapist, Registered Nurse and Rehabilitation Attendant    Thank you for this referral.  Meir Campbell PT, DPT   Time Calculation: 25 mins

## 2017-06-21 NOTE — PROGRESS NOTES
Urology Progress Note    Patient: Shyann Lee MRN: [id number]  SSN: [id number]    YOB: 1975  Age: 42 y.o. Sex: male        ADMITTED: 2017 to Allen Samson MD for Hyponatremia  POD# * No surgery found *     Diagnosis: R renal trauma    Still with some R sided pain. HD stable. Clear urine. Vitals: Temp (24hrs), Av.2 °F (36.8 °C), Min:98.1 °F (36.7 °C), Max:98.3 °F (36.8 °C)                   Blood pressure (!) 138/91, pulse 67, temperature 98.3 °F (36.8 °C), resp. rate 18, height 6' 3\" (1.905 m), weight 109.7 kg (241 lb 13.5 oz), SpO2 94 %. Intake and Output:   1901 -  0700  In: 9569 [P.O.:2446; I.V.:2795]  Out: 7775 [Urine:7775]            Labs:  Labs:   Lab Results   Component Value Date/Time    WBC 8.8 2017 02:25 AM    HGB 13.3 2017 02:25 AM    Creatinine 0.84 2017 02:25 AM         Assessment/Plan:   Will order CT today.           Signed By: Chiquita Moreira MD - 2017

## 2017-06-21 NOTE — PROGRESS NOTES
Spiritual Care Assessment/Progress Notes    Mauro Mccarty [id number]  [id number]    1975  42 y.o.  male    Patient Telephone Number: There is no home phone number on file. Mormonism Affiliation: Elizabethi   Language: English   Extended Emergency Contact Information  Primary Emergency Contact: [address] Phone: [phone]  Work Phone: [phone]  Mobile Phone: [phone]  Relation: Other Relative   Patient Active Problem List    Diagnosis Date Noted    Hyponatremia 06/18/2017    Renal hematoma 06/18/2017    Alcohol use disorder, severe, dependence ([facility] 07/28/2015    Depression, major 07/27/2015    Substance induced mood disorder ([facility] 03/26/2015    Pulmonary embolism ([facility] 10/09/2014    Alcohol dependence ([facility] 10/05/2014    Suicidal ideation 10/05/2014    Anxiety disorder     Mood disorder ([facility]     Psychiatric disorder     Other ill-defined conditions     DDD (degenerative disc disease)     Sciatica     Delusional disorder ([facility] 09/28/2011    Hallucinations 09/28/2011        Date: 6/21/2017       Level of Mormonism/Spiritual Activity:  []         Involved in jimenez tradition/spiritual practice    []         Not involved in jimenez tradition/spiritual practice  []         Spiritually oriented    []         Claims no spiritual orientation    []         seeking spiritual identity  []         Feels alienated from Tenriism practice/tradition  []         Feels angry about Tenriism practice/tradition  [x]         Spirituality/Tenriism tradition is a resource for coping at this time.   []         Not able to assess due to medical condition    Services Provided Today:  []         crisis intervention    []         reading Scriptures  []         spiritual assessment    []         prayer  []         empathic listening/emotional support  []         rites and rituals (cite in comments)  []         life review     [x]         Tenriism support  [] theological development   []         advocacy  []         ethical dialog     []         blessing  []         bereavement support    []         support to family  []         anticipatory grief support   []         help with AMD  []         spiritual guidance    []         meditation      Spiritual Care Needs  []         Emotional Support  []         Spiritual/Roman Catholic Care  []         Loss/Adjustment  []         Advocacy/Referral                /Ethics  [x]         No needs expressed at               this time  []         Other: (note in               comments)  [address]  []         Follow up visits with               pt/family  []         Provide materials  []         Schedule sacraments  []         Contact Community               Clergy  [x]         Follow up as needed  []         Other: (note in               comments)     Request via [address] for bible for patient. Delivered [facility] bible to room 466 where Mr. Lexy Henao was alone (with sitter). When asked he declined further spiritual support. Reminded patient of  availability as needed. [facility]'s Staff  (Gildardo Root Patient Care Specialist)   Paging Service [phone])

## 2017-06-21 NOTE — PROGRESS NOTES
Physical Therapy  6.21.17    Orders acknowledged, received, and appreciated. Chart reviewed. Will follow-up for formal PT evaluation, if appropriate, following interdisciplinary rounds. Thank you.   Álvaro Cason, PT, DPT

## 2017-06-21 NOTE — PROGRESS NOTES
Patient called the nonemergency number for the police to come and visit him as he is concerned that he has outstanding warrants.

## 2017-06-21 NOTE — PROGRESS NOTES
Bedside and Verbal shift change report given to [facility]. Conrad Ayala (oncoming nurse) by Marcela Galindo RN (offgoing nurse). Report included the following information SBAR, Kardex, Intake/Output, Recent Results and Cardiac Rhythm NSR. Had an uneventful night. Sitter remains at bedside    Bedside and Verbal shift change report given to Lito Malone RN (oncoming nurse[facility] Russ Roberts RN (offgoing nurse). Report included the following information SBAR, Kardex, Intake/Output, Recent Results and Cardiac Rhythm NSR.

## 2017-06-21 NOTE — PROGRESS NOTES
Occupational Therapy EVALUATION/discharge  Patient: Jose Jerome (11 y.o. male)  Date: 6/21/2017  Primary Diagnosis: Hyponatremia        Precautions:   Fall, Seizure (Suicide precautions;1:1 sitter)    ASSESSMENT:   Pt was cleared for OT evaluation by ZAK Solano. Pt received seated EOB and agreeable to OT. 1:1 sitter present pre and post session (exited briefly during session while OT and RN present with pt). Based on the objective data described below, the patient presents with independent performance with ADL and related mobility. Pt denied dizziness and dyspnea with all mobility. Pt demonstrated independence using of mobile phone, ability to legibly write and followed all directions from OT without noted difficulty. Pt endorses fall history in past two years related to inebriation. Pt reports no falls while sober. OT reviewed fall/injury prevention strategies with pt. Pt verbalized agreement and understanding. Noted plan for pt to transition to behavioral health services once cleared medically. Further skilled acute occupational therapy is not indicated at this time. Discharge Recommendations: To behavioral health once medically clear per psychiatry  Further Equipment Recommendations for Discharge: None      SUBJECTIVE:   Patient stated [employer] you, have a good day. 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 No pertinent surgical history. Prior Level of Function/Home Situation: Pt endorses one story residence. Chart notes no permanent residence.   Expanded or extensive additional review of patient history:     [address] [place] [address] Environment: Private residence (One story per pt)  # Steps to Enter: 0  Rails to [employer]: No  One/Two Story Residence: One story  # of Interior Steps:  (na)  Height of Each Step (in):  (na)  Interior Rails: None  Lift Chair Available: No  Living Alone: Yes  Support Systems: None  Patient Expects to be Discharged to[de-identified] Unknown  Current DME Used/Available at Home: None  []  Right hand dominant   []  Left hand dominant    EXAMINATION OF PERFORMANCE DEFICITS:  Cognitive/Behavioral Status:  Neurologic State: Alert  Orientation Level: Oriented X4  Cognition: Follows commands  Perception: Appears intact  Perseveration: No perseveration noted  Safety/Judgement: Awareness of environment; Fall prevention    Skin: Appears intact    Edema: No edema noted BUE    Hearing: Auditory  Auditory Impairment: None    Vision/Perceptual:    Tracking:  (Appears intact)              Visual Fields:  (Appears intact)       Acuity: Within Defined Limits         Range of Motion:    AROM: Within functional limits  PROM: Within functional limits                      Strength:    Strength: Within functional limits                Coordination:  Coordination: Within functional limits  Fine Motor Skills-Upper: Left Intact; Right Intact    Gross Motor Skills-Upper: Left Intact; Right Intact    Tone & Sensation:    Tone: Normal  Sensation: Intact                      Balance:  Sitting: Intact  Standing: Intact    Functional Mobility and Transfers for ADLs:  Bed Mobility:       Transfers:  Sit to Stand: Independent  Stand to Sit: Independent  Bed to Chair: Independent  Toilet Transfer : Independent    ADL Assessment:  Feeding: Independent    Oral Facial Hygiene/Grooming: Independent (Standing sink level)    Bathing: Independent    Upper Body Dressing: Independent    Lower Body Dressing: Independent    Toileting: Independent                ADL Intervention and task modifications:          Recommend ADL completed with supervision from nursing staff for pt safety (suicide precautions). Cognitive Retraining  Safety/Judgement: Awareness of environment; Fall prevention       Functional Measure:  Barthel Index:    Bathin  Bladder: 10  Bowels: 10  Groomin  Dressing: 10  Feeding: 10  Mobility: 15  Stairs: 10  Toilet Use: 10  Transfer (Bed to Chair and Back): 15  Total: 100       Barthel and G-code impairment scale:  Percentage of impairment CH  0% CI  1-19% CJ  20-39% CK  40-59% CL  60-79% CM  80-99% CN  100%   Barthel Score 0-100 100 99-80 79-60 59-40 20-39 1-19   0   Barthel Score 0-20 20 17-19 13-16 9-12 5-8 1-4 0      The Barthel ADL Index: Guidelines  1. The index should be used as a record of what a patient does, not as a record of what a patient could do. 2. The main aim is to establish degree of independence from any help, physical or verbal, however minor and for whatever reason. 3. The need for supervision renders the patient not independent. 4. A patient's performance should be established using the best available evidence. Asking the patient, friends/relatives and nurses are the usual sources, but direct observation and common sense are also important. However direct testing is not needed. 5. Usually the patient's performance over the preceding 24-48 hours is important, but occasionally longer periods will be relevant. 6. Middle categories imply that the patient supplies over 50 per cent of the effort. 7. Use of aids to be independent is allowed. Jaymie Manchester., Barthel, D.W. (8413). Functional evaluation: the Barthel Index. [facility] (14)2. DAI Chapin [facility]., Yaron Perez., [place], [address] [place] [address] (). Measuring the change indisability after inpatient rehabilitation; comparison of the responsiveness of the Barthel Index and Functional [place] Measure. Journal of Neurology, Neurosurgery, and Psychiatry, 66(4), 585-675.   Elina Bacon NPATRICIA, LIVIER Flores, Shefali Alfaro MNenitaA. (2004.) Assessment of post-stroke quality of life in cost-effectiveness studies: The usefulness of the Barthel Index and the EuroQoL-5D. Quality of Life Research, 13, 169-86       G codes: In compliance with CMSs Claims Based Outcome Reporting, the following G-code set was chosen for this patient based on their primary functional limitation being treated: The outcome measure chosen to determine the severity of the functional limitation was the Barthel Index with a score of 100/100 which was correlated with the impairment scale. ? Self Care:     - CURRENT STATUS: CH - 0% impaired, limited or restricted    - GOAL STATUS: CH - 0% impaired, limited or restricted    - D/C STATUS:  CH - 0% impaired, limited or restricted       Occupational Therapy Evaluation Charge Determination   History Examination Decision-Making   LOW Complexity : Brief history review  LOW Complexity : 1-3 performance deficits relating to physical, cognitive , or psychosocial skils that result in activity limitations and / or participation restrictions  LOW Complexity : No comorbidities that affect functional and no verbal or physical assistance needed to complete eval tasks       Based on the above components, the patient evaluation is determined to be of the following complexity level: LOW   Pain:Pain Scale 1: Numeric (0 - 10)  Pain Intensity 1: 7  Pain Location 1: Abdomen;Back  Pain Orientation 1: Right  Pain Description 1: Sharp; Stabbing  Pain Intervention(s) 1: Medication (see MAR)  Activity Tolerance:   Pt denied dizziness and dyspnea during session.      After treatment:   [x]  Patient left in no apparent distress seated EOB per pt request  []  Patient left in no apparent distress in bed  [x]  Call bell left within reach  [x]  Nursing notified  [x]  1:1 sitter present  []  Bed alarm activated    COMMUNICATION/EDUCATION:   Communication/Collaboration:  [x]      Home safety education was provided and the patient/caregiver indicated understanding. [x]      Patient/family have participated as able and agree with findings and recommendations. []      Patient is unable to participate in plan of care at this time.   Findings and recommendations were discussed with: Physical Therapist and Registered Nurse    Sabrina Jewell  Time Calculation: 8 mins

## 2017-06-21 NOTE — PROGRESS NOTES
Problem: Falls - Risk of  Goal: *Absence of falls  Outcome: Progressing Towards Goal  Has sitter with him to help prevent falls. Goal: *Knowledge of fall prevention  Outcome: Progressing Towards Goal  Verbalizes understanding.

## 2017-06-22 VITALS
HEART RATE: 87 BPM | WEIGHT: 241.84 LBS | TEMPERATURE: 98.3 F | SYSTOLIC BLOOD PRESSURE: 151 MMHG | HEIGHT: 75 IN | OXYGEN SATURATION: 96 % | RESPIRATION RATE: 16 BRPM | BODY MASS INDEX: 30.07 KG/M2 | DIASTOLIC BLOOD PRESSURE: 98 MMHG

## 2017-06-22 LAB
ANION GAP BLD CALC-SCNC: 7 MMOL/L (ref 5–15)
BUN SERPL-MCNC: 9 MG/DL (ref 6–20)
BUN/CREAT SERPL: 10 (ref 12–20)
CALCIUM SERPL-MCNC: 8.3 MG/DL (ref 8.5–10.1)
CHLORIDE SERPL-SCNC: 100 MMOL/L (ref 97–108)
CO2 SERPL-SCNC: 26 MMOL/L (ref 21–32)
CREAT SERPL-MCNC: 0.87 MG/DL (ref 0.7–1.3)
GLUCOSE SERPL-MCNC: 89 MG/DL (ref 65–100)
MAGNESIUM SERPL-MCNC: 2.3 MG/DL (ref 1.6–2.4)
POTASSIUM SERPL-SCNC: 4.1 MMOL/L (ref 3.5–5.1)
SODIUM SERPL-SCNC: 133 MMOL/L (ref 136–145)

## 2017-06-22 PROCEDURE — 80048 BASIC METABOLIC PNL TOTAL CA: CPT | Performed by: HOSPITALIST

## 2017-06-22 PROCEDURE — 74011250637 HC RX REV CODE- 250/637: Performed by: HOSPITALIST

## 2017-06-22 PROCEDURE — 36415 COLL VENOUS BLD VENIPUNCTURE: CPT | Performed by: HOSPITALIST

## 2017-06-22 PROCEDURE — 83735 ASSAY OF MAGNESIUM: CPT | Performed by: HOSPITALIST

## 2017-06-22 RX ORDER — VENLAFAXINE 75 MG/1
TABLET ORAL
Qty: 21 TAB | Refills: 0 | Status: SHIPPED | OUTPATIENT
Start: 2017-06-22

## 2017-06-22 RX ORDER — ACETAMINOPHEN 325 MG/1
650 TABLET ORAL
Qty: 30 TAB | Refills: 0 | Status: SHIPPED | OUTPATIENT
Start: 2017-06-22

## 2017-06-22 RX ORDER — LISINOPRIL 10 MG/1
10 TABLET ORAL DAILY
Qty: 30 TAB | Refills: 1 | Status: SHIPPED | OUTPATIENT
Start: 2017-06-22

## 2017-06-22 RX ORDER — METOPROLOL TARTRATE 50 MG/1
50 TABLET ORAL 2 TIMES DAILY
Qty: 60 TAB | Refills: 1 | Status: SHIPPED | OUTPATIENT
Start: 2017-06-22

## 2017-06-22 RX ADMIN — LISINOPRIL 5 MG: 5 TABLET ORAL at 09:32

## 2017-06-22 RX ADMIN — VENLAFAXINE 150 MG: 37.5 TABLET ORAL at 09:32

## 2017-06-22 RX ADMIN — POLYETHYLENE GLYCOL 3350 17 G: 17 POWDER, FOR SOLUTION ORAL at 09:33

## 2017-06-22 RX ADMIN — DOCUSATE SODIUM AND SENNOSIDES 1 TABLET: 8.6; 5 TABLET, FILM COATED ORAL at 09:32

## 2017-06-22 RX ADMIN — METOPROLOL TARTRATE 50 MG: 50 TABLET ORAL at 09:32

## 2017-06-22 RX ADMIN — OXYCODONE HYDROCHLORIDE 5 MG: 5 TABLET ORAL at 00:27

## 2017-06-22 RX ADMIN — DIAZEPAM 2 MG: 2 TABLET ORAL at 03:49

## 2017-06-22 RX ADMIN — ACETAMINOPHEN 650 MG: 325 TABLET, FILM COATED ORAL at 09:32

## 2017-06-22 NOTE — DISCHARGE INSTRUCTIONS
Please bring this form with you to show your primary care provider at your follow-up appointment. Primary care provider:  Dr. Natalie Stearns MD    Discharging provider:  Redd Lucas MD    You have been admitted to the hospital with the following diagnoses:  · Hyponatremia   · Alcohol Withdrawal  · Right Renal Hematoma  · Depression    FOLLOW-UP CARE RECOMMENDATIONS:    APPOINTMENTS:  Follow-up Information     Follow up With Details Comments Contact Info    Natalie Stearns MD In 1 week Discharge follow up  [address] [facility] [address]      Casa Potter MD  1-2 Month F/u with Urologist for Repeat CT of abdomen [facility]  [address] [place] [address] recommended:NONE    PENDING TEST RESULTS:  At the time of your discharge the following test results are still pending: NONE  Please make sure you review these results with your outpatient follow-up provider(s). SYMPTOMS to watch for: chest pain, shortness of breath, fever, chills, nausea, vomiting, diarrhea, change in mentation, falling, weakness, bleeding. DIET/what to eat:  Regular Diet    ACTIVITY:  Activity as tolerated    WOUND CARE: NONE    EQUIPMENT needed:  NONE      What to do if new or unexpected symptoms occur? If you experience any of the above symptoms (or should other concerns or questions arise after discharge) please call your primary care physician. Return to the emergency room if you cannot get hold of your doctor. · It is very important that you keep your follow-up appointment(s). · Please bring discharge papers, medication list (and/or medication bottles) to your follow-up appointments for review by your outpatient provider(s). · Please check the list of medications and be sure it includes every medication (even non-prescription medications) that your provider wants you to take.     · It is important that you take the medication exactly as they are prescribed. · Keep your medication in the bottles provided by the pharmacist and keep a list of the medication names, dosages, and times to be taken in your wallet. · Do not take other medications without consulting your doctor. · If you have any questions about your medications or other instructions, please talk to your nurse or care provider before you leave the hospital.    I understand that if any problems occur once I am at home I am to contact my physician. These instructions were explained to me and I had the opportunity to ask questions.

## 2017-06-22 NOTE — PROGRESS NOTES
CM was advised that patient will be discharged today. CM met with patient who said that he had transport to home. Patient said that he receives primary care at [place] with mental health services and substance abuse abuse services available upon his request.  Patient requested that prescriptions be faxed to [facility]. CM spoke with Karyn Brock at [facility] who requested that clinical info be faxed with the prescriptions to expedite patient receiving medication (otherwise [facility] would have to request clinicals from [facility] Records which would delay patient receiving medication). CM faxed the prescriptions and clinical information as requested (Fax [phone]).

## 2017-06-22 NOTE — PROGRESS NOTES
1125: attempted to schedule follow up appt for pt at PCP. Unable to do so. Per the office staff member the office will call pt within 1-2 business days to schedule an appt. 1153: iv and tele dcd,. DISCHARGE SUMMARY from Nurse    The following personal items are in your possession at time of discharge:    Dental Appliances: None  Visual Aid: None     Home Medications: None  Jewelry: None  Clothing: At bedside, Footwear, Shirt, Shorts, With patient  Other Valuables: Cell Phone             PATIENT INSTRUCTIONS:    After general anesthesia or intravenous sedation, for 24 hours or while taking prescription Narcotics:  · Limit your activities  · Do not drive and operate hazardous machinery  · Do not make important personal or business decisions  · Do  not drink alcoholic beverages  · If you have not urinated within 8 hours after discharge, please contact your surgeon on call. Report the following to your surgeon:  · Excessive pain, swelling, redness or odor of or around the surgical area  · Temperature over 100.5  · Nausea and vomiting lasting longer than 4 hours or if unable to take medications  · Any signs of decreased circulation or nerve impairment to extremity: change in color, persistent  numbness, tingling, coldness or increase pain  · Any questions        What to do at Home:  Recommended activity: Activity as tolerated. If you experience any of the following symptoms chest pain, please follow up with PCP. *  Please give a list of your current medications to your Primary Care Provider. *  Please update this list whenever your medications are discontinued, doses are      changed, or new medications (including over-the-counter products) are added. *  Please carry medication information at all times in case of emergency situations.           These are general instructions for a healthy lifestyle:    No smoking/ No tobacco products/ Avoid exposure to second hand smoke    Surgeon General's Warning: Quitting smoking now greatly reduces serious risk to your health. Obesity, smoking, and sedentary lifestyle greatly increases your risk for illness    A healthy diet, regular physical exercise & weight monitoring are important for maintaining a healthy lifestyle    You may be retaining fluid if you have a history of heart failure or if you experience any of the following symptoms:  Weight gain of 3 pounds or more overnight or 5 pounds in a week, increased swelling in our hands or feet or shortness of breath while lying flat in bed. Please call your doctor as soon as you notice any of these symptoms; do not wait until your next office visit. Recognize signs and symptoms of STROKE:    F-face looks uneven    A-arms unable to move or move unevenly    S-speech slurred or non-existent    T-time-call 911 as soon as signs and symptoms begin-DO NOT go       Back to bed or wait to see if you get better-TIME IS BRAIN. Warning Signs of HEART ATTACK     Call 911 if you have these symptoms:   Chest discomfort. Most heart attacks involve discomfort in the center of the chest that lasts more than a few minutes, or that goes away and comes back. It can feel like uncomfortable pressure, squeezing, fullness, or pain.  Discomfort in other areas of the upper body. Symptoms can include pain or discomfort in one or both arms, the back, neck, jaw, or stomach.  Shortness of breath with or without chest discomfort.  Other signs may include breaking out in a cold sweat, nausea, or lightheadedness. Don't wait more than five minutes to call [address]! Fast action can save your life. Calling 911 is almost always the fastest way to get lifesaving treatment. Emergency Medical Services staff can begin treatment when they arrive  up to an hour sooner than if someone gets to the hospital by car. The discharge information has been reviewed with the patient. The patient verbalized understanding.     Discharge medications reviewed with the patient and appropriate educational materials and side effects teaching were provided. 1153: per pt request all 4 RX faxed to [facility] done by case management.

## 2017-06-22 NOTE — DISCHARGE SUMMARY
Discharge Summary     Patient:  Ingris Mallory       MRN: [id number]       YOB: 1975       Age: 42 y.o. Date of admission:  6/18/2017    Date of discharge:  6/22/2017    Primary care provider: Dr. Lachelle Boland MD    Admitting provider:  Laurie Renner MD    Discharging provider:  Linn Severs, MD - [phone]  If unavailable, call [phone] and ask the  to page the triage hospitalist.    Consultations  · IP CONSULT TO HOSPITALIST  · IP CONSULT TO UROLOGY  · IP CONSULT TO PSYCHIATRY    Procedures  · * No surgery found *    Discharge destination: HOME. The patient is stable for discharge. Admission diagnosis  · Hyponatremia   · Alcohol Withdrawals  · Rt Renal Hematoma  · Depression    Current Discharge Medication List      START taking these medications    Details   lisinopril (PRINIVIL, ZESTRIL) 10 mg tablet Take 1 Tab by mouth daily. Qty: 30 Tab, Refills: 1      acetaminophen (TYLENOL) 325 mg tablet Take 2 Tabs by mouth every six (6) hours as needed. Qty: 30 Tab, Refills: 0         CONTINUE these medications which have CHANGED    Details   metoprolol tartrate (LOPRESSOR) 50 mg tablet Take 1 Tab by mouth two (2) times a day. Qty: 60 Tab, Refills: 1      venlafaxine (EFFEXOR) 75 mg tablet Take 2 tab in AM 1 tab PM  Qty: 21 Tab, Refills: 0         CONTINUE these medications which have NOT CHANGED    Details   LORazepam (ATIVAN) 1 mg tablet Take 1 Tab by mouth nightly as needed for Anxiety. Max Daily Amount: 1 mg. Qty: 5 Tab, Refills: 0      ondansetron (ZOFRAN ODT) 4 mg disintegrating tablet Take 1 Tab by mouth every eight (8) hours as needed for Nausea. Qty: 15 Tab, Refills: 0      zolpidem (AMBIEN) 10 mg tablet Take 1 Tab by mouth nightly as needed for Sleep.  Max Daily Amount: 10 mg.  Qty: 10 Tab, Refills: 0             Follow-up Information     Follow up With Details Comments Contact Info    Sundar Tillman MD In 1 week Discharge follow up  [address]      Bebeto Harrison MD  1-2 Month F/u with Urologist for Repeat CT of abdomen [address]  [phone]            Final discharge diagnoses and brief hospital course  Please also refer to the admission H&P for details on the presenting problem. 43 yo male with PMHx of Alcohol abuse, HTN, PE, DDD, admitted with intoxication and Rt flank pain and suicidal ideation.      Alcohol abuse with withdrawal: (POA)Last drink 12 PM 6/18 high risk DTs,  -loaded with diazepam, and monitored with CIWA scale  -provide goodie bag  -counseled him on alcohol cessation, would reinforce this when his mood is improved      Hyponatremia: (POA) this is likely due to poor solute intake/beer potomania  - resolved with IVF      Right renal hematoma: (POA), traumatic  -consult urology  - repeat CT without significant change noted, d/w , he is cleared for discharge. Pt needs a repeat CT in 6 weeks       Bilateral pulmonary emboli, Chronic: (POA) these were present in 2014, no chest pain or hypoxia currently. Tachycardia is likely due to the other problems.  -no anticoagulation for now given renal hematoma  - discussed IVC filter, not interested      Depression and anxiety w/ suicidal ideation:  -place with 1:1 sitter while inpatient, offered Psych inpt admission, patient refused.  D/w Psych, okay to d/c home  -Psych eval noted      HTN: likely confounded by alcohol withdrawl  -resume home metoprolol, added lisinopril      Frequent falls: due to alcohol abuse  -fall precautions for now  -PT eval: no further PT needed       FOLLOW-UP TESTS recommended:NONE     PENDING TEST RESULTS:  At the time of your discharge the following test results are still pending: NONE  Please make sure you review these results with your outpatient follow-up provider(s).     SYMPTOMS to watch for: chest pain, shortness of breath, fever, chills, nausea, vomiting, diarrhea, change in mentation, falling, weakness, bleeding.     DIET/what to eat:  Regular Diet     ACTIVITY:  Activity as tolerated     WOUND CARE: NONE     EQUIPMENT needed:  NONE    Physical examination at discharge  Visit Vitals    BP (!) 151/98 (BP 1 Location: Right arm, BP Patient Position: Sitting)    Pulse 87    Temp 98.3 °F (36.8 °C)    Resp 16    Ht 6' 3\" (1.905 m)    Wt 109.7 kg (241 lb 13.5 oz)    SpO2 96%    BMI 30.23 kg/m2     AO3  PERRLA  EOMI  Lung: CTA  CVS: RRR  ABd: soft ND, mild Rt flank discomfort  Ext: no edema    Pertinent imaging studies:  CT abd/pel: 6/18: IMPRESSION:  Right renal subcapsular hematoma. Suggestion of bilateral lower lobe pulmonary emboli although the bolus is not optimal for evaluation. Recommend further evaluation with chest CTA. Hepatic steatosis. CT abd/pel: 6/21  IMPRESSION:  No significant change in the subcapsular renal hematoma on the right.         Recent Labs      06/20/17   0225   WBC  8.8   HGB  13.3   HCT  39.2   PLT  179     Recent Labs      06/22/17   0358  06/20/17   0225   NA  133*  133*   K  4.1  3.9   CL  100  100   CO2  26  27   BUN  9  9   CREA  0.87  0.84   GLU  89  110*   CA  8.3*  8.1*   MG  2.3  2.5*   PHOS   --   3.5     Recent Labs      06/20/17 0225   SGOT  22   AP  124*   TP  6.6   ALB  2.9*   GLOB  3.7     No results for input(s): INR, PTP, APTT in the last 72 hours. No lab exists for component: INREXT   No results for input(s): FE, TIBC, PSAT, FERR in the last 72 hours. No results for input(s): PH, PCO2, PO2 in the last 72 hours. No results for input(s): CPK, CKMB in the last 72 hours.     No lab exists for component: TROPONINI  No components found for: [place]    Chronic Diagnoses:    Problem List as of 6/22/2017  Date Reviewed: 10/9/2014          Codes Class Noted - Resolved    * (Principal)Hyponatremia ICD-10-CM: E87.1  ICD-9-CM: 276.1  6/18/2017 - Present Renal hematoma ICD-10-CM: S37.019A  ICD-9-CM: 866.01  6/18/2017 - Present        Alcohol use disorder, severe, dependence ([facility] ICD-10-CM: F10.20  ICD-9-CM: 303.90  7/28/2015 - Present        Depression, major ICD-10-CM: F32.9  ICD-9-CM: 296.20  7/27/2015 - Present        Substance induced mood disorder (HCC) ICD-10-CM: F19.94  ICD-9-CM: 292.84  3/26/2015 - Present        Pulmonary embolism (HCC) ICD-10-CM: I26.99  ICD-9-CM: 415.19  10/9/2014 - Present        Alcohol dependence ([facility] ICD-10-CM: F10.20  ICD-9-CM: 303.90  10/5/2014 - Present        Suicidal ideation ICD-10-CM: R45.851  ICD-9-CM: V62.84  10/5/2014 - Present        Anxiety disorder ICD-10-CM: F41.9  ICD-9-CM: 300.00  Unknown - Present        Mood disorder ([facility] ICD-10-CM: F39  ICD-9-CM: 296.90  Unknown - Present        Psychiatric disorder ICD-10-CM: F99  ICD-9-CM: 300.9  Unknown - Present    Overview Signed 11/21/2011 11:35 AM by Mellissa Sanabria LPN     depression             Other ill-defined conditions ICD-10-CM: R69  ICD-9-CM: 799.89  Unknown - Present    Overview Signed 11/21/2011 11:35 AM by Mellissa Sanabria LPN     sciatica, bone disease in hip age 9             DDD (degenerative disc disease) ICD-10-CM: [id number]  ICD-9-CM: 722.6  Unknown - Present        Sciatica ICD-10-CM: M54.30  ICD-9-CM: 724.3  Unknown - Present        Delusional disorder ([facility] ICD-10-CM: F22  ICD-9-CM: 297.1  9/28/2011 - Present        Hallucinations ICD-10-CM: R44.3  ICD-9-CM: 780.1  9/28/2011 - Present        RESOLVED: Confusion state ICD-10-CM: F44.89  ICD-9-CM: 298.9  9/26/2011 - 9/27/2011        RESOLVED: Disorientation ICD-10-CM: R41.0  ICD-9-CM: 780.99  9/26/2011 - 9/27/2011        RESOLVED: Confusion ICD-10-CM: R41.0  ICD-9-CM: 298.9  9/19/2011 - 9/22/2011        RESOLVED: Bipolar I disorder, most recent episode (or current) manic, severe, specified as with psychotic behavior ICD-10-CM: F31.2  ICD-9-CM: 296.44  9/19/2011 - 9/27/2011              Time spent on discharge related activities today greater than 30 minutes. Signed:  Davy Rao MD                 Hospitalist, Internal Medicine      Cc:  Juan A Graves MD

## 2017-06-22 NOTE — PROGRESS NOTES
Bedside and Verbal shift change report given to Manpreet [id number]. Nilo Glynn (oncoming nurse) by Delbert Gonzalez RN (offgoing nurse). Report included the following information SBAR, Kardex, Procedure Summary, Intake/Output, Recent Results, Med Rec Status and Cardiac Rhythm NSR. Because increasingly agitated as the night wore on. Around 61 51 81 the patient became more anxious and was given 2 mg Valium p.o.    0435:  Called to patient's room stating his heart was racing and he couldn't sleep. HR was 92. Discussed with him relaxation techniques so he could maybe sleep.

## 2017-06-22 NOTE — PROGRESS NOTES
Pt states no longer has suicidal thoughts, symptoms. He is not sure he is ready for medical discharge because of back pain associated with renal bruise though note tylenol is sufficient to manage pain. Apprehensive about discharge since it will be with new residents. However he is clear that he does not  need psychiatric inpatient care and will [facility]. On mental status he is alert and oriented. Hygiene and grooming ok. Interacts with some reserve. Speech is goal directed, normal rate, rhythm, and volume, and is not pressured. Mood and affect unremarkable. No suicidal thoughts, plans, intent. No psychotic sx expressed. Cognitive fx unimpaired. Dx: Alcohol abuse, no longer present and this strongly influenced suicidal sx which ae now gone. Can e discharged.

## 2017-06-22 NOTE — PROGRESS NOTES
Problem: Falls - Risk of  Goal: *Absence of falls  Outcome: Progressing Towards Goal  Becoming more steady on his feet.   Has sitter with him  Goal: *Knowledge of fall prevention  Outcome: Progressing Towards Goal  Verbalizes understanding and agreement

## 2017-07-01 ENCOUNTER — HOSPITAL ENCOUNTER (EMERGENCY)
Age: 42
Discharge: HOME | End: 2017-07-01
Attending: EMERGENCY MEDICINE
Payer: MEDICARE

## 2017-07-01 VITALS
OXYGEN SATURATION: 96 % | DIASTOLIC BLOOD PRESSURE: 107 MMHG | TEMPERATURE: 98.1 F | BODY MASS INDEX: 31.25 KG/M2 | SYSTOLIC BLOOD PRESSURE: 145 MMHG | HEIGHT: 75 IN | HEART RATE: 74 BPM | RESPIRATION RATE: 18 BRPM | WEIGHT: 251.32 LBS

## 2017-07-01 DIAGNOSIS — F41.9 ANXIETY: Primary | ICD-10-CM

## 2017-07-01 PROCEDURE — 99281 EMR DPT VST MAYX REQ PHY/QHP: CPT

## 2017-07-01 RX ORDER — LORAZEPAM 1 MG/1
1 TABLET ORAL
Qty: 5 TAB | Refills: 0 | Status: SHIPPED | OUTPATIENT
Start: 2017-07-01
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
## 2017-07-01 NOTE — DISCHARGE INSTRUCTIONS
Anxiety Disorder: Care Instructions  Your Care Instructions  Anxiety is a normal reaction to stress. Difficult situations can cause you to have symptoms such as sweaty palms and a nervous feeling. In an anxiety disorder, the symptoms are far more severe. Constant worry, muscle tension, trouble sleeping, nausea and diarrhea, and other symptoms can make normal daily activities difficult or impossible. These symptoms may occur for no reason, and they can affect your work, school, or social life. Medicines, counseling, and self-care can all help. Follow-up care is a key part of your treatment and safety. Be sure to make and go to all appointments, and call your doctor if you are having problems. It's also a good idea to know your test results and keep a list of the medicines you take. How can you care for yourself at home? · Take medicines exactly as directed. Call your doctor if you think you are having a problem with your medicine. · Go to your counseling sessions and follow-up appointments. · Recognize and accept your anxiety. Then, when you are in a situation that makes you anxious, say to yourself, \"This is not an emergency. I feel uncomfortable, but I am not in danger. I can keep going even if I feel anxious. \"  · Be kind to your body:  ¨ Relieve tension with exercise or a massage. ¨ Get enough rest.  ¨ Avoid alcohol, caffeine, nicotine, and illegal drugs. They can increase your anxiety level and cause sleep problems. ¨ Learn and do relaxation techniques. See below for more about these techniques. · Engage your mind. Get out and do something you enjoy. Go to a funny movie, or take a walk or hike. Plan your day. Having too much or too little to do can make you anxious. · Keep a record of your symptoms. Discuss your fears with a good friend or family member, or join a support group for people with similar problems. Talking to others sometimes relieves stress.   · Get involved in social groups, or volunteer to help others. Being alone sometimes makes things seem worse than they are. · Get at least 30 minutes of exercise on most days of the week to relieve stress. Walking is a good choice. You also may want to do other activities, such as running, swimming, cycling, or playing tennis or team sports. Relaxation techniques  Do relaxation exercises 10 to 20 minutes a day. You can play soothing, relaxing music while you do them, if you wish. · Tell others in your house that you are going to do your relaxation exercises. Ask them not to disturb you. · Find a comfortable place, away from all distractions and noise. · Lie down on your back, or sit with your back straight. · Focus on your breathing. Make it slow and steady. · Breathe in through your nose. Breathe out through either your nose or mouth. · Breathe deeply, filling up the area between your navel and your rib cage. Breathe so that your belly goes up and down. · Do not hold your breath. · Breathe like this for 5 to 10 minutes. Notice the feeling of calmness throughout your whole body. As you continue to breathe slowly and deeply, relax by doing the following for another 5 to 10 minutes:  · Tighten and relax each muscle group in your body. You can begin at your toes and work your way up to your head. · Imagine your muscle groups relaxing and becoming heavy. · Empty your mind of all thoughts. · Let yourself relax more and more deeply. · Become aware of the state of calmness that surrounds you. · When your relaxation time is over, you can bring yourself back to alertness by moving your fingers and toes and then your hands and feet and then stretching and moving your entire body. Sometimes people fall asleep during relaxation, but they usually wake up shortly afterward. · Always give yourself time to return to full alertness before you drive a car or do anything that might cause an accident if you are not fully alert.  Never play a relaxation tape while you drive a car. When should you call for help? Call 911 anytime you think you may need emergency care. For example, call if:  · You feel you cannot stop from hurting yourself or someone else. Keep the numbers for these national suicide hotlines: 0-785-879-TALK (5-280.953.8484) and 6-086-XAKTAOD (6-329.908.9068). If you or someone you know talks about suicide or feeling hopeless, get help right away. Watch closely for changes in your health, and be sure to contact your doctor if:  · You have anxiety or fear that affects your life. · You have symptoms of anxiety that are new or different from those you had before. Where can you learn more? Go to http://s[facility].info/. Enter P754 in the search box to learn more about \"Anxiety Disorder: Care Instructions. \"  Current as of: July 26, 2016  Content Version: 11.3  © 2474-2589 [employer]. Care instructions adapted under license by [employer] (which disclaims liability or warranty for this information). If you have questions about a medical condition or this instruction, always ask your healthcare professional. [address] any warranty or liability for your use of this information. We hope that we have addressed all of your medical concerns. The examination and treatment you received in the Emergency Department were for an emergent problem and were not intended as complete care. It is important that you follow up with your healthcare provider(s) for ongoing care. If your symptoms worsen or do not improve as expected, and you are unable to reach your usual health care provider(s), you should return to the Emergency Department. Today's healthcare is undergoing tremendous change, and patient satisfaction surveys are one of the many tools to assess the quality of medical care.   You may receive a survey from the [employer] regarding your experience in the Emergency Department. I hope that your experience has been completely positive, particularly the medical care that I provided. As such, please participate in the survey; anything less than excellent does not meet my expectations or intentions. [address] [place] [address] and [employer] participate in nationally recognized quality of care measures. If your blood pressure is greater than 120/80, as reported below, we urge that you seek medical care to address the potential of high blood pressure, commonly known as hypertension. Hypertension can be hereditary or can be caused by certain medical conditions, pain, stress, or \"white coat syndrome. \"       Please make an appointment with your health care provider(s) for follow up of your Emergency Department visit. VITALS:   Patient Vitals for the past 8 hrs:   Temp Pulse Resp BP SpO2   07/01/17 1313 98.1 °F (36.7 °C) 74 18 (!) 145/107 96 %          Thank you for allowing us to provide you with medical care today. We realize that you have many choices for your emergency care needs. Please choose us in the future for any continued health care needs.       Rishi Amaya MD    [address] [place] [address].   Office: [phone]
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
